# Patient Record
Sex: FEMALE | Race: BLACK OR AFRICAN AMERICAN | Employment: FULL TIME | URBAN - METROPOLITAN AREA
[De-identification: names, ages, dates, MRNs, and addresses within clinical notes are randomized per-mention and may not be internally consistent; named-entity substitution may affect disease eponyms.]

---

## 2017-10-28 ENCOUNTER — APPOINTMENT (OUTPATIENT)
Dept: ULTRASOUND IMAGING | Age: 22
End: 2017-10-28
Attending: EMERGENCY MEDICINE
Payer: COMMERCIAL

## 2017-10-28 ENCOUNTER — HOSPITAL ENCOUNTER (EMERGENCY)
Age: 22
Discharge: HOME | End: 2017-10-28
Attending: EMERGENCY MEDICINE | Admitting: EMERGENCY MEDICINE
Payer: COMMERCIAL

## 2017-10-28 VITALS
TEMPERATURE: 98.6 F | OXYGEN SATURATION: 100 % | SYSTOLIC BLOOD PRESSURE: 115 MMHG | RESPIRATION RATE: 18 BRPM | DIASTOLIC BLOOD PRESSURE: 72 MMHG | WEIGHT: 142 LBS | BODY MASS INDEX: 26.13 KG/M2 | HEIGHT: 62 IN | HEART RATE: 76 BPM

## 2017-10-28 DIAGNOSIS — K80.50 BILIARY COLIC: Primary | ICD-10-CM

## 2017-10-28 DIAGNOSIS — K80.20 GALLSTONES: ICD-10-CM

## 2017-10-28 LAB
ALBUMIN SERPL-MCNC: 3.7 G/DL (ref 3.5–5)
ALBUMIN/GLOB SERPL: 0.9 {RATIO} (ref 1.1–2.2)
ALP SERPL-CCNC: 34 U/L (ref 45–117)
ALT SERPL-CCNC: 29 U/L (ref 12–78)
ANION GAP SERPL CALC-SCNC: 7 MMOL/L (ref 5–15)
APPEARANCE UR: CLEAR
AST SERPL-CCNC: 50 U/L (ref 15–37)
BASOPHILS # BLD: 0.1 K/UL (ref 0–0.1)
BASOPHILS NFR BLD: 1 % (ref 0–1)
BILIRUB SERPL-MCNC: 0.5 MG/DL (ref 0.2–1)
BILIRUB UR QL: NEGATIVE
BUN SERPL-MCNC: 9 MG/DL (ref 6–20)
BUN/CREAT SERPL: 11 (ref 12–20)
CALCIUM SERPL-MCNC: 8.8 MG/DL (ref 8.5–10.1)
CHLORIDE SERPL-SCNC: 106 MMOL/L (ref 97–108)
CO2 SERPL-SCNC: 24 MMOL/L (ref 21–32)
COLOR UR: ABNORMAL
CREAT SERPL-MCNC: 0.82 MG/DL (ref 0.55–1.02)
EOSINOPHIL # BLD: 0.1 K/UL (ref 0–0.4)
EOSINOPHIL NFR BLD: 2 % (ref 0–7)
ERYTHROCYTE [DISTWIDTH] IN BLOOD BY AUTOMATED COUNT: 13.8 % (ref 11.5–14.5)
GLOBULIN SER CALC-MCNC: 4.1 G/DL (ref 2–4)
GLUCOSE SERPL-MCNC: 85 MG/DL (ref 65–100)
GLUCOSE UR STRIP.AUTO-MCNC: NEGATIVE MG/DL
HCT VFR BLD AUTO: 40.5 % (ref 35–47)
HGB BLD-MCNC: 13.8 G/DL (ref 11.5–16)
HGB UR QL STRIP: NEGATIVE
KETONES UR QL STRIP.AUTO: ABNORMAL MG/DL
LEUKOCYTE ESTERASE UR QL STRIP.AUTO: NEGATIVE
LIPASE SERPL-CCNC: 239 U/L (ref 73–393)
LYMPHOCYTES # BLD: 4.6 K/UL (ref 0.8–3.5)
LYMPHOCYTES NFR BLD: 59 % (ref 12–49)
MCH RBC QN AUTO: 29.2 PG (ref 26–34)
MCHC RBC AUTO-ENTMCNC: 34.1 G/DL (ref 30–36.5)
MCV RBC AUTO: 85.6 FL (ref 80–99)
MONOCYTES # BLD: 0.4 K/UL (ref 0–1)
MONOCYTES NFR BLD: 5 % (ref 5–13)
NEUTS SEG # BLD: 2.6 K/UL (ref 1.8–8)
NEUTS SEG NFR BLD: 33 % (ref 32–75)
NITRITE UR QL STRIP.AUTO: NEGATIVE
PH UR STRIP: 6 [PH] (ref 5–8)
PLATELET # BLD AUTO: 339 K/UL (ref 150–400)
POTASSIUM SERPL-SCNC: 5 MMOL/L (ref 3.5–5.1)
PROT SERPL-MCNC: 7.8 G/DL (ref 6.4–8.2)
PROT UR STRIP-MCNC: NEGATIVE MG/DL
RBC # BLD AUTO: 4.73 M/UL (ref 3.8–5.2)
SODIUM SERPL-SCNC: 137 MMOL/L (ref 136–145)
SP GR UR REFRACTOMETRY: >1.03 (ref 1–1.03)
UROBILINOGEN UR QL STRIP.AUTO: 1 EU/DL (ref 0.2–1)
WBC # BLD AUTO: 7.8 K/UL (ref 3.6–11)

## 2017-10-28 PROCEDURE — 81003 URINALYSIS AUTO W/O SCOPE: CPT | Performed by: EMERGENCY MEDICINE

## 2017-10-28 PROCEDURE — 99284 EMERGENCY DEPT VISIT MOD MDM: CPT

## 2017-10-28 PROCEDURE — 76705 ECHO EXAM OF ABDOMEN: CPT

## 2017-10-28 PROCEDURE — 36415 COLL VENOUS BLD VENIPUNCTURE: CPT | Performed by: EMERGENCY MEDICINE

## 2017-10-28 PROCEDURE — 83690 ASSAY OF LIPASE: CPT | Performed by: EMERGENCY MEDICINE

## 2017-10-28 PROCEDURE — 80053 COMPREHEN METABOLIC PANEL: CPT | Performed by: EMERGENCY MEDICINE

## 2017-10-28 PROCEDURE — 85025 COMPLETE CBC W/AUTO DIFF WBC: CPT | Performed by: EMERGENCY MEDICINE

## 2017-10-28 RX ORDER — OXYCODONE AND ACETAMINOPHEN 5; 325 MG/1; MG/1
1 TABLET ORAL
Qty: 10 TAB | Refills: 0 | Status: ON HOLD | OUTPATIENT
Start: 2017-10-28 | End: 2017-11-13

## 2017-10-28 NOTE — ED PROVIDER NOTES
HPI Comments: 25 y.o. female with no significant past medical history who presents from home with chief complaint of abd pain. Per pt, she has been experiencing intermittent RUQ pain for the past month. She notes that the pain has occurred predominantly during the morning, however recently her pains have appeared more frequently. Per pt, her RUQ pain lasts for, \"a couple hours\" during onset and is associated with nausea. Currently, the pt notes that her pain has decreased some compared to this morning. She rates her current pain 4/10. The pt notes that she thought her pains were related to food, however her pain has occurred during times she did not eat for several hours. Per pt, her grandmother is the only known relative who has received a cholecystectomy in the past. The pt denies fever, chills, V/D, CP, SOB, dizziness, frequency, dysuria and other urinary symptoms. There are no other acute medical concerns at this time. Social hx: Non-smoker, No ETOH use   PCP: Melanie Carver MD    Note written by Karen Reyes, as dictated by Gabrielle Zurita MD 8:25 AM           The history is provided by the patient. No  was used. No past medical history on file. No past surgical history on file. No family history on file. Social History     Social History    Marital status: SINGLE     Spouse name: N/A    Number of children: N/A    Years of education: N/A     Occupational History    Not on file. Social History Main Topics    Smoking status: Never Smoker    Smokeless tobacco: Never Used    Alcohol use No    Drug use: No    Sexual activity: Yes     Partners: Male     Birth control/ protection: Condom, Injection     Other Topics Concern    Not on file     Social History Narrative    No narrative on file         ALLERGIES: Review of patient's allergies indicates no known allergies. Review of Systems   Constitutional: Negative.   Negative for appetite change, chills and fever. HENT: Negative. Negative for rhinorrhea, sore throat and trouble swallowing. Eyes: Negative. Negative for photophobia. Respiratory: Negative. Negative for cough and shortness of breath. Cardiovascular: Negative. Negative for chest pain and palpitations. Gastrointestinal: Positive for abdominal pain (intermittent over the past month to her RUQ and predominant during the mornings. Pain lasts for a couple hours per each episode ) and nausea ( associated with abd pain ). Negative for vomiting. Endocrine: Negative. Genitourinary: Negative. Negative for dysuria, frequency and hematuria. Musculoskeletal: Negative. Negative for arthralgias. Skin: Negative. Allergic/Immunologic: Negative. Neurological: Negative. Negative for dizziness, syncope and weakness. Hematological: Negative. Psychiatric/Behavioral: Negative. Negative for behavioral problems. The patient is not nervous/anxious. All other systems reviewed and are negative. There were no vitals filed for this visit. Physical Exam   Constitutional: She appears well-developed and well-nourished. HENT:   Head: Normocephalic and atraumatic. Mouth/Throat: Oropharynx is clear and moist.   Eyes: EOM are normal. Pupils are equal, round, and reactive to light. Neck: Normal range of motion. Neck supple. Cardiovascular: Normal rate, regular rhythm, normal heart sounds and intact distal pulses. Exam reveals no gallop and no friction rub. No murmur heard. Pulmonary/Chest: Effort normal. No respiratory distress. She has no wheezes. She has no rales. Abdominal: Soft. There is tenderness (RUQ). There is no rebound. Musculoskeletal: Normal range of motion. She exhibits no tenderness. Neurological: She is alert. No cranial nerve deficit. Motor; symmetric   Skin: No erythema. Psychiatric: She has a normal mood and affect. Her behavior is normal.   Nursing note and vitals reviewed.      Note written by Karen Devlin, as dictated by Caroline Vega MD 8:25 AM    [facility]  ED Course       Procedures

## 2017-10-28 NOTE — PROGRESS NOTES
Prior to Admission Medications   Prescriptions Last Dose Informant Patient Reported? Taking? NORETHINDRONE-E.ESTRADIOL-IRON (MINASTRIN 24 FE PO) 10/27/2017 at 1300  Yes Yes   Sig: Take 1 Tab by mouth daily. Facility-Administered Medications: None   Self: List updated per patient interview. Birth control pill added.

## 2017-10-28 NOTE — DISCHARGE INSTRUCTIONS
Learning About Gallstones  What are gallstones? Gallstones are stones that form in the gallbladder. The gallbladder is a small sac located just under the liver. It stores bile released by the liver. Bile helps you digest fats. Gallstones can be smaller than a grain of sand or as large as a golf ball. Gallstones form when cholesterol and other things found in bile make stones. They can also form if the gallbladder doesn't empty as it should. Gallstones can also form in the common bile duct or cystic duct. These tubes carry bile from the gallbladder and the liver to the small intestine. What happens when you have gallstones? Gallstones can cause many different problems, such as:  · A blockage in the common bile duct. · Inflammation or infection of the gallbladder (acute cholecystitis). This can happen when a gallstone blocks the cystic duct. · Inflammation or infection of the common bile duct (cholangitis). This can happen when gallstones get stuck in the common bile duct. In rare cases, this can damage the liver or spread infection. · Pancreatitis, an inflammation of the pancreas. What are the symptoms? · Most people who have gallstones don't have symptoms. · Symptoms can include:  ¨ Mild pain in the pit of your stomach or in the upper right part of your belly. It may spread to your right upper back or shoulder blade area. ¨ Severe pain that may come and go or be steady. It may get worse when you eat. ¨ Fever and chills, if a gallstone is blocking a bile duct and causing an infection. ¨ Yellowing of your skin and the whites of your eyes. How can you prevent gallstones? There is no sure way to prevent gallstones. But you can reduce your risk of forming gallstones that can cause symptoms. · Stay at a healthy weight. If you need to lose weight, do so slowly and sensibly. · Eat regular, balanced meals. · Be active, and exercise regularly. How are gallstones treated?   · If you don't have symptoms, you probably don't need treatment. · For mild symptoms, your doctor may have you take pain medicine and wait to see if the pain goes away. · For severe pain or infection, or if you have more than one gallstone attack, your doctor may suggest surgery to have your gallbladder removed. The body works fine without a gallbladder. Follow-up care is a key part of your treatment and safety. Be sure to make and go to all appointments, and call your doctor if you are having problems. It's also a good idea to know your test results and keep a list of the medicines you take. Where can you learn more? Go to http://arlene-eddie.info/. Enter  in the search box to learn more about \"Learning About Gallstones. \"  Current as of: May 12, 2017  Content Version: 11.4  © 4888-5044 Healthwise, [employer]. Care instructions adapted under license by [employer] (which disclaims liability or warranty for this information). If you have questions about a medical condition or this instruction, always ask your healthcare professional. [address] any warranty or liability for your use of this information.

## 2017-11-01 ENCOUNTER — OFFICE VISIT (OUTPATIENT)
Dept: SURGERY | Age: 22
End: 2017-11-01

## 2017-11-01 VITALS
WEIGHT: 139 LBS | BODY MASS INDEX: 25.58 KG/M2 | HEIGHT: 62 IN | RESPIRATION RATE: 18 BRPM | DIASTOLIC BLOOD PRESSURE: 62 MMHG | TEMPERATURE: 98.9 F | SYSTOLIC BLOOD PRESSURE: 118 MMHG

## 2017-11-01 DIAGNOSIS — K80.20 SYMPTOMATIC CHOLELITHIASIS: Primary | ICD-10-CM

## 2017-11-01 NOTE — PATIENT INSTRUCTIONS
Cholecystectomy: Before Your Surgery  What is cholecystectomy? Cholecystectomy (od-iwx-wqw-MALLORY-tuh-julian) is a type of surgery. It removes a diseased gallbladder. This surgery is usually done as a laparoscopic surgery. The doctor puts a lighted tube and other surgical tools through small cuts (incisions) in your belly. The tube is called a scope. It lets your doctor see your organs so he or she can do the surgery. The incisions leave scars that fade with time. Most people go home the same day. You probably will feel better each day. Most people have only a small amount of pain after 1 week. If you have a desk job, you can probably go back to work in 1 to 2 weeks. If you lift heavy objects or have a very active job, it may take up to 4 weeks. In some cases, open surgery is the best choice. Your doctor may choose open surgery in advance. Or he or she may choose it in the middle of laparoscopic surgery. In open surgery, the doctor makes a larger incision in your upper belly. If you have open surgery, you will probably stay in the hospital for 2 to 4 days. And it may take 4 to 6 weeks to get back to your normal routine. Follow-up care is a key part of your treatment and safety. Be sure to make and go to all appointments, and call your doctor if you are having problems. It's also a good idea to know your test results and keep a list of the medicines you take. What happens before surgery? ?Surgery can be stressful. This information will help you understand what you can expect. And it will help you safely prepare for surgery. ? Preparing for surgery  ? · Understand exactly what surgery is planned, along with the risks, benefits, and other options. · Tell your doctors ALL the medicines, vitamins, supplements, and herbal remedies you take. Some of these can increase the risk of bleeding or interact with anesthesia. ?  · If you take blood thinners, such as warfarin (Coumadin), clopidogrel (Plavix), or aspirin, be sure to talk to your doctor. He or she will tell you if you should stop taking these medicines before your surgery. Make sure that you understand exactly what your doctor wants you to do.   ? · Your doctor will tell you which medicines to take or stop before your surgery. You may need to stop taking certain medicines a week or more before surgery. So talk to your doctor as soon as you can.   ? · If you have an advance directive, let your doctor know. It may include a living will and a durable power of  for health care. Bring a copy to the hospital. If you don't have one, you may want to prepare one. It lets your doctor and loved ones know your health care wishes. Doctors advise that everyone prepare these papers before any type of surgery or procedure. ? · You may need to empty your colon with an enema or laxative. Your doctor will tell you how to do this. What happens on the day of surgery? · Follow the instructions exactly about when to stop eating and drinking. If you don't, your surgery may be canceled. If your doctor told you to take your medicines on the day of surgery, take them with only a sip of water. ? · Take a bath or shower before you come in for your surgery. Do not apply lotions, perfumes, deodorants, or nail polish. ? · Do not shave the surgical site yourself. ? · Take off all jewelry and piercings. And take out contact lenses, if you wear them. ? At the hospital or surgery center   · Bring a picture ID. ? · The area for surgery is often marked to make sure there are no errors. ? · You will be kept comfortable and safe by your anesthesia provider. You will be asleep during the surgery. ? · The surgery usually takes 1 to 2 hours. Going home   · Be sure you have someone to drive you home. Anesthesia and pain medicine make it unsafe for you to drive. ? · You will be given more specific instructions about recovering from your surgery.  They will cover things like diet, wound care, follow-up care, driving, and getting back to your normal routine. When should you call your doctor? · You have questions or concerns. ? · You don't understand how to prepare for your surgery. ? · You become ill before the surgery (such as fever, flu, or a cold). ? · You need to reschedule or have changed your mind about having the surgery. Where can you learn more? Go to http://arlene-eddie.info/. Enter A883 in the search box to learn more about \"Cholecystectomy: Before Your Surgery. \"  Current as of: May 12, 2017  Content Version: 11.4  © 8070-1249 Healthwise, [employer]. Care instructions adapted under license by [employer] (which disclaims liability or warranty for this information). If you have questions about a medical condition or this instruction, always ask your healthcare professional. [address] any warranty or liability for your use of this information.

## 2017-11-01 NOTE — LETTER
NOTIFICATION OF RETURN TO WORK / SCHOOL 
 
11/30/2017 1:53 PM 
 
Ms. Clair Bennett [address] [place] [address] [place] To Whom It May Concern: 
 
Clair Bennett was under the care of [address] [place] [address] from 11/13/17 to 11/30/17. She will be able to return to work on 11/30/17 with no restrictions. If there are questions or concerns please have the patient contact our office. Sincerely, Ulysses Iqbal MD

## 2017-11-01 NOTE — PROGRESS NOTES
[facility] General Surgery History and Physical    History of Present Illness:      Neptali Fallon is a 25 y.o. female who has been having epigastric and RUQ abdominal pain. The pain started a few months ago but has become more painful recently. The pain is a 8 fo 10 when she has it. The pain seems to occur randomly and not always associated with food. She has some nausea with the pain but no vomiting. She was recently seen in the ED for this pain. The pain will last a few hours and then go away. PMH - none    PSH - none    Current Outpatient Prescriptions:     NORETHINDRONE-E.ESTRADIOL-IRON (MINASTRIN 24 FE PO), Take 1 Tab by mouth daily. , Disp: , Rfl:     oxyCODONE-acetaminophen (PERCOCET) 5-325 mg per tablet, Take 1 Tab by mouth every four (4) hours as needed for Pain. Max Daily Amount: 6 Tabs., Disp: 10 Tab, Rfl: 0    No Known Allergies    Social History     Social History    Marital status: SINGLE     Spouse name: N/A    Number of children: N/A    Years of education: N/A     Occupational History    Not on file.      Social History Main Topics    Smoking status: Never Smoker    Smokeless tobacco: Never Used    Alcohol use No    Drug use: No    Sexual activity: Yes     Partners: Male     Birth control/ protection: Condom, Injection     Other Topics Concern    Not on file     Social History Narrative       Family History   Problem Relation Age of Onset   [facility] Cancer Father     Hypertension Father     Cancer Maternal Grandmother     Hypertension Maternal Grandmother        ROS   Constitutional: negative  Ears, Nose, Mouth, Throat, and Face: negative  Respiratory: negative  Cardiovascular: negative  Gastrointestinal: positive for nausea and abdominal pain  Genitourinary:negative  Integument/Breast: negative  Hematologic/Lymphatic: negative  Behavioral/Psychiatric: negative  Allergic/Immunologic: negative      Physical Exam:     Visit Vitals    /62    Temp 98.9 °F (37.2 °C)    Resp 18    Ht 5' 2\" (1.575 m)    Wt 139 lb (63 kg)    BMI 25.42 kg/m2       General - alert and oriented, no apparent distress  HEENT - no jaundice, no hearing imparement  Pulm - CTAB, no C/W/R  CV - RRR, no M/R/G  Abd - soft, ND, BS present, NTTP, no hernia  Ext - pulses intact in UE and LE bilaterally, no edema  Skin - supple, no rashes  Psychiatric - normal affect, good mood    Labs  Lab Results   Component Value Date/Time    Sodium 137 10/28/2017 08:20 AM    Potassium 5.0 10/28/2017 08:20 AM    Chloride 106 10/28/2017 08:20 AM    CO2 24 10/28/2017 08:20 AM    Anion gap 7 10/28/2017 08:20 AM    Glucose 85 10/28/2017 08:20 AM    BUN 9 10/28/2017 08:20 AM    Creatinine 0.82 10/28/2017 08:20 AM    BUN/Creatinine ratio 11 10/28/2017 08:20 AM    GFR est AA >60 10/28/2017 08:20 AM    GFR est non-AA >60 10/28/2017 08:20 AM    Calcium 8.8 10/28/2017 08:20 AM    Bilirubin, total 0.5 10/28/2017 08:20 AM    AST (SGOT) 50 10/28/2017 08:20 AM    Alk. phosphatase 34 10/28/2017 08:20 AM    Protein, total 7.8 10/28/2017 08:20 AM    Albumin 3.7 10/28/2017 08:20 AM    Globulin 4.1 10/28/2017 08:20 AM    A-G Ratio 0.9 10/28/2017 08:20 AM    ALT (SGPT) 29 10/28/2017 08:20 AM     Lab Results   Component Value Date/Time    WBC 7.8 10/28/2017 08:20 AM    Hemoglobin (POC) 14.2 08/01/2012 09:38 AM    HGB 13.8 10/28/2017 08:20 AM    HCT 40.5 10/28/2017 08:20 AM    PLATELET 029 09/22/2847 08:20 AM    MCV 85.6 10/28/2017 08:20 AM         Imaging  RUQ US - The liver is  normal. The common bile duct is normal measuring 2.5 mm in diameter. The  gallbladder contains gallstones. The sonographic Peter Shell sign is absent. There is  no evidence of cholecystitis. The right kidney measures 9.6 cm in length. There  is no hydronephrosis or visible ascites. I have reviewed and agree with all of the pertinent images    Assessment:     Rocco Lopez is a 25 y.o. female with symptomatic cholelithiasis    Recommendations:     1.    She does have pain cause by gallstones but no infection of the GB. She will need laparoscopic cholecystectomy in the OR. I have discussed the above procedure with the patient in detail. We reviewed the benefits and possible complications of the surgery which include bleeding, infection, damage to adjacent organs, venous thromboembolism, need for repeat surgery, death and other unforseen complications. The patient agreed to proceed with the surgery. Reba Okeefe MD    Ms. Leyda [place] has a reminder for a \"due or due soon\" health maintenance. I have asked that she contact her primary care provider for follow-up on this health maintenance.

## 2017-11-01 NOTE — LETTER
11/1/2017 12:10 PM 
 
Ms. Okey Ponds [place] [facility] [address] Dear Ms. Cynthia Romero, 
 
Surgery is scheduled as follows: 
 
Surgery date: 11/13/2017      Location: [place] [address] 
 
Arrival time: 05:30 AM    Start time: 07:30 AM 
 
DO NOT EAT OR DRINK ANYTHING PAST MIDNIGHT THE NIGHT BEFORE SURGERY 1st Post Operative appointment:  
 
Monday, November 27, 2017 09:00 AM with [address] Office Phone: [phone] For questions regarding this information please contact Vira Torres at [phone]. Sincerely, 
 
 
Vira Torres Surgical Scheduler

## 2017-11-01 NOTE — LETTER
11/1/2017 3:59 PM 
 
Patient:  Kolby Horner YOB: 1995 Date of Visit: 11/1/2017 Dear Jaren Ribeiro 
[address] [address] [place] Facsimile: [phone] 
 : Thank you for referring Ms. Kolby Horner to me for evaluation/treatment. Below are the relevant portions of my assessment and plan of care. If you have questions, please do not hesitate to call me. I look forward to following Ms. Lisette Hernández along with you. Sincerely, Magy Sanchez MD

## 2017-11-01 NOTE — PROGRESS NOTES
1. Have you been to the ER, urgent care clinic since your last visit? Hospitalized since your last visit?  10/28/17 [facility] ED abd pain    2. Have you seen or consulted any other health care providers outside of the [address] [place] [address] since your last visit? Include any pap smears or colon screening.    no

## 2017-11-09 ENCOUNTER — ANESTHESIA EVENT (OUTPATIENT)
Dept: SURGERY | Age: 22
End: 2017-11-09
Payer: COMMERCIAL

## 2017-11-13 ENCOUNTER — HOSPITAL ENCOUNTER (OUTPATIENT)
Age: 22
Setting detail: OUTPATIENT SURGERY
Discharge: HOME | End: 2017-11-13
Attending: SURGERY | Admitting: SURGERY
Payer: COMMERCIAL

## 2017-11-13 ENCOUNTER — ANESTHESIA (OUTPATIENT)
Dept: SURGERY | Age: 22
End: 2017-11-13
Payer: COMMERCIAL

## 2017-11-13 VITALS
BODY MASS INDEX: 25.76 KG/M2 | HEART RATE: 99 BPM | SYSTOLIC BLOOD PRESSURE: 113 MMHG | OXYGEN SATURATION: 100 % | HEIGHT: 62 IN | WEIGHT: 140 LBS | TEMPERATURE: 97.6 F | RESPIRATION RATE: 15 BRPM | DIASTOLIC BLOOD PRESSURE: 61 MMHG

## 2017-11-13 LAB — HCG UR QL: NEGATIVE

## 2017-11-13 PROCEDURE — 74011000250 HC RX REV CODE- 250

## 2017-11-13 PROCEDURE — 77030011640 HC PAD GRND REM COVD -A: Performed by: SURGERY

## 2017-11-13 PROCEDURE — 77030002895 HC DEV VASC CLOSR COVD -B: Performed by: SURGERY

## 2017-11-13 PROCEDURE — 77030031139 HC SUT VCRL2 J&J -A: Performed by: SURGERY

## 2017-11-13 PROCEDURE — 77030010507 HC ADH SKN DERMBND J&J -B: Performed by: SURGERY

## 2017-11-13 PROCEDURE — 77030035051: Performed by: SURGERY

## 2017-11-13 PROCEDURE — 77030026438 HC STYL ET INTUB CARD -A: Performed by: ANESTHESIOLOGY

## 2017-11-13 PROCEDURE — 77030002933 HC SUT MCRYL J&J -A: Performed by: SURGERY

## 2017-11-13 PROCEDURE — 77030037032 HC INSRT SCIS CLICKLLINE DISP STOR -B: Performed by: SURGERY

## 2017-11-13 PROCEDURE — 76060000033 HC ANESTHESIA 1 TO 1.5 HR: Performed by: SURGERY

## 2017-11-13 PROCEDURE — 74011250636 HC RX REV CODE- 250/636

## 2017-11-13 PROCEDURE — 77030035045 HC TRCR ENDOSC VRSPRT BLDLSS COVD -B: Performed by: SURGERY

## 2017-11-13 PROCEDURE — 77030020053 HC ELECTRD LAPSCP COVD -B: Performed by: SURGERY

## 2017-11-13 PROCEDURE — 88304 TISSUE EXAM BY PATHOLOGIST: CPT | Performed by: SURGERY

## 2017-11-13 PROCEDURE — 76010000149 HC OR TIME 1 TO 1.5 HR: Performed by: SURGERY

## 2017-11-13 PROCEDURE — 74011250636 HC RX REV CODE- 250/636: Performed by: SURGERY

## 2017-11-13 PROCEDURE — 77030008684 HC TU ET CUF COVD -B: Performed by: ANESTHESIOLOGY

## 2017-11-13 PROCEDURE — 74011250636 HC RX REV CODE- 250/636: Performed by: ANESTHESIOLOGY

## 2017-11-13 PROCEDURE — 76210000006 HC OR PH I REC 0.5 TO 1 HR: Performed by: SURGERY

## 2017-11-13 PROCEDURE — 77030032490 HC SLV COMPR SCD KNE COVD -B: Performed by: SURGERY

## 2017-11-13 PROCEDURE — 77030020747 HC TU INSUF ENDOSC TELE -A: Performed by: SURGERY

## 2017-11-13 PROCEDURE — 77030035048 HC TRCR ENDOSC OPTCL COVD -B: Performed by: SURGERY

## 2017-11-13 PROCEDURE — 77030020782 HC GWN BAIR PAWS FLX 3M -B

## 2017-11-13 PROCEDURE — 76210000020 HC REC RM PH II FIRST 0.5 HR: Performed by: SURGERY

## 2017-11-13 PROCEDURE — 77030009852 HC PCH RTVR ENDOSC COVD -B: Performed by: SURGERY

## 2017-11-13 PROCEDURE — 77030012029 HC APPL CLP LIG COVD -C: Performed by: SURGERY

## 2017-11-13 PROCEDURE — 81025 URINE PREGNANCY TEST: CPT

## 2017-11-13 PROCEDURE — 74011000250 HC RX REV CODE- 250: Performed by: SURGERY

## 2017-11-13 RX ORDER — OXYCODONE AND ACETAMINOPHEN 5; 325 MG/1; MG/1
1 TABLET ORAL AS NEEDED
Status: DISCONTINUED | OUTPATIENT
Start: 2017-11-13 | End: 2017-11-13 | Stop reason: HOSPADM

## 2017-11-13 RX ORDER — LIDOCAINE HYDROCHLORIDE 20 MG/ML
INJECTION, SOLUTION EPIDURAL; INFILTRATION; INTRACAUDAL; PERINEURAL AS NEEDED
Status: DISCONTINUED | OUTPATIENT
Start: 2017-11-13 | End: 2017-11-13 | Stop reason: HOSPADM

## 2017-11-13 RX ORDER — DIPHENHYDRAMINE HYDROCHLORIDE 50 MG/ML
12.5 INJECTION, SOLUTION INTRAMUSCULAR; INTRAVENOUS AS NEEDED
Status: DISCONTINUED | OUTPATIENT
Start: 2017-11-13 | End: 2017-11-13 | Stop reason: HOSPADM

## 2017-11-13 RX ORDER — BUPIVACAINE HYDROCHLORIDE AND EPINEPHRINE 5; 5 MG/ML; UG/ML
30 INJECTION, SOLUTION EPIDURAL; INTRACAUDAL; PERINEURAL ONCE
Status: COMPLETED | OUTPATIENT
Start: 2017-11-14 | End: 2017-11-13

## 2017-11-13 RX ORDER — DEXAMETHASONE SODIUM PHOSPHATE 4 MG/ML
INJECTION, SOLUTION INTRA-ARTICULAR; INTRALESIONAL; INTRAMUSCULAR; INTRAVENOUS; SOFT TISSUE AS NEEDED
Status: DISCONTINUED | OUTPATIENT
Start: 2017-11-13 | End: 2017-11-13 | Stop reason: HOSPADM

## 2017-11-13 RX ORDER — SODIUM CHLORIDE 0.9 % (FLUSH) 0.9 %
5-10 SYRINGE (ML) INJECTION AS NEEDED
Status: DISCONTINUED | OUTPATIENT
Start: 2017-11-13 | End: 2017-11-13 | Stop reason: HOSPADM

## 2017-11-13 RX ORDER — ROCURONIUM BROMIDE 10 MG/ML
INJECTION, SOLUTION INTRAVENOUS AS NEEDED
Status: DISCONTINUED | OUTPATIENT
Start: 2017-11-13 | End: 2017-11-13 | Stop reason: HOSPADM

## 2017-11-13 RX ORDER — FENTANYL CITRATE 50 UG/ML
INJECTION, SOLUTION INTRAMUSCULAR; INTRAVENOUS AS NEEDED
Status: DISCONTINUED | OUTPATIENT
Start: 2017-11-13 | End: 2017-11-13 | Stop reason: HOSPADM

## 2017-11-13 RX ORDER — MIDAZOLAM HYDROCHLORIDE 1 MG/ML
1 INJECTION, SOLUTION INTRAMUSCULAR; INTRAVENOUS AS NEEDED
Status: DISCONTINUED | OUTPATIENT
Start: 2017-11-13 | End: 2017-11-13 | Stop reason: HOSPADM

## 2017-11-13 RX ORDER — ONDANSETRON 2 MG/ML
INJECTION INTRAMUSCULAR; INTRAVENOUS AS NEEDED
Status: DISCONTINUED | OUTPATIENT
Start: 2017-11-13 | End: 2017-11-13 | Stop reason: HOSPADM

## 2017-11-13 RX ORDER — LIDOCAINE HYDROCHLORIDE 10 MG/ML
0.1 INJECTION, SOLUTION EPIDURAL; INFILTRATION; INTRACAUDAL; PERINEURAL AS NEEDED
Status: DISCONTINUED | OUTPATIENT
Start: 2017-11-13 | End: 2017-11-13 | Stop reason: HOSPADM

## 2017-11-13 RX ORDER — HYDROMORPHONE HYDROCHLORIDE 1 MG/ML
INJECTION, SOLUTION INTRAMUSCULAR; INTRAVENOUS; SUBCUTANEOUS AS NEEDED
Status: DISCONTINUED | OUTPATIENT
Start: 2017-11-13 | End: 2017-11-13 | Stop reason: HOSPADM

## 2017-11-13 RX ORDER — MIDAZOLAM HYDROCHLORIDE 1 MG/ML
0.5 INJECTION, SOLUTION INTRAMUSCULAR; INTRAVENOUS
Status: DISCONTINUED | OUTPATIENT
Start: 2017-11-13 | End: 2017-11-13 | Stop reason: HOSPADM

## 2017-11-13 RX ORDER — OXYCODONE AND ACETAMINOPHEN 5; 325 MG/1; MG/1
1-2 TABLET ORAL
Qty: 40 TAB | Refills: 0 | Status: SHIPPED | OUTPATIENT
Start: 2017-11-13 | End: 2018-06-17

## 2017-11-13 RX ORDER — SODIUM CHLORIDE 0.9 % (FLUSH) 0.9 %
5-10 SYRINGE (ML) INJECTION EVERY 8 HOURS
Status: DISCONTINUED | OUTPATIENT
Start: 2017-11-13 | End: 2017-11-13 | Stop reason: HOSPADM

## 2017-11-13 RX ORDER — GLYCOPYRROLATE 0.2 MG/ML
INJECTION INTRAMUSCULAR; INTRAVENOUS AS NEEDED
Status: DISCONTINUED | OUTPATIENT
Start: 2017-11-13 | End: 2017-11-13 | Stop reason: HOSPADM

## 2017-11-13 RX ORDER — SODIUM CHLORIDE, SODIUM LACTATE, POTASSIUM CHLORIDE, CALCIUM CHLORIDE 600; 310; 30; 20 MG/100ML; MG/100ML; MG/100ML; MG/100ML
INJECTION, SOLUTION INTRAVENOUS
Status: DISCONTINUED | OUTPATIENT
Start: 2017-11-13 | End: 2017-11-13 | Stop reason: HOSPADM

## 2017-11-13 RX ORDER — FENTANYL CITRATE 50 UG/ML
25 INJECTION, SOLUTION INTRAMUSCULAR; INTRAVENOUS
Status: DISCONTINUED | OUTPATIENT
Start: 2017-11-13 | End: 2017-11-13 | Stop reason: HOSPADM

## 2017-11-13 RX ORDER — PROPOFOL 10 MG/ML
INJECTION, EMULSION INTRAVENOUS AS NEEDED
Status: DISCONTINUED | OUTPATIENT
Start: 2017-11-13 | End: 2017-11-13 | Stop reason: HOSPADM

## 2017-11-13 RX ORDER — NEOSTIGMINE METHYLSULFATE 1 MG/ML
INJECTION INTRAVENOUS AS NEEDED
Status: DISCONTINUED | OUTPATIENT
Start: 2017-11-13 | End: 2017-11-13 | Stop reason: HOSPADM

## 2017-11-13 RX ORDER — SODIUM CHLORIDE, SODIUM LACTATE, POTASSIUM CHLORIDE, CALCIUM CHLORIDE 600; 310; 30; 20 MG/100ML; MG/100ML; MG/100ML; MG/100ML
125 INJECTION, SOLUTION INTRAVENOUS CONTINUOUS
Status: DISCONTINUED | OUTPATIENT
Start: 2017-11-13 | End: 2017-11-13 | Stop reason: HOSPADM

## 2017-11-13 RX ORDER — MORPHINE SULFATE 10 MG/ML
2 INJECTION, SOLUTION INTRAMUSCULAR; INTRAVENOUS
Status: DISCONTINUED | OUTPATIENT
Start: 2017-11-13 | End: 2017-11-13 | Stop reason: HOSPADM

## 2017-11-13 RX ORDER — ONDANSETRON 2 MG/ML
4 INJECTION INTRAMUSCULAR; INTRAVENOUS AS NEEDED
Status: DISCONTINUED | OUTPATIENT
Start: 2017-11-13 | End: 2017-11-13 | Stop reason: HOSPADM

## 2017-11-13 RX ORDER — MIDAZOLAM HYDROCHLORIDE 1 MG/ML
INJECTION, SOLUTION INTRAMUSCULAR; INTRAVENOUS AS NEEDED
Status: DISCONTINUED | OUTPATIENT
Start: 2017-11-13 | End: 2017-11-13 | Stop reason: HOSPADM

## 2017-11-13 RX ORDER — SODIUM CHLORIDE 9 MG/ML
1000 INJECTION, SOLUTION INTRAVENOUS CONTINUOUS
Status: DISCONTINUED | OUTPATIENT
Start: 2017-11-13 | End: 2017-11-13 | Stop reason: HOSPADM

## 2017-11-13 RX ORDER — FENTANYL CITRATE 50 UG/ML
50 INJECTION, SOLUTION INTRAMUSCULAR; INTRAVENOUS AS NEEDED
Status: DISCONTINUED | OUTPATIENT
Start: 2017-11-13 | End: 2017-11-13 | Stop reason: HOSPADM

## 2017-11-13 RX ORDER — SODIUM CHLORIDE 9 MG/ML
50 INJECTION, SOLUTION INTRAVENOUS CONTINUOUS
Status: DISCONTINUED | OUTPATIENT
Start: 2017-11-13 | End: 2017-11-13 | Stop reason: HOSPADM

## 2017-11-13 RX ORDER — HYDROMORPHONE HYDROCHLORIDE 1 MG/ML
0.2 INJECTION, SOLUTION INTRAMUSCULAR; INTRAVENOUS; SUBCUTANEOUS
Status: DISCONTINUED | OUTPATIENT
Start: 2017-11-13 | End: 2017-11-13 | Stop reason: HOSPADM

## 2017-11-13 RX ORDER — CEFAZOLIN SODIUM/WATER 2 G/20 ML
2 SYRINGE (ML) INTRAVENOUS ONCE
Status: COMPLETED | OUTPATIENT
Start: 2017-11-14 | End: 2017-11-13

## 2017-11-13 RX ORDER — SUCCINYLCHOLINE CHLORIDE 20 MG/ML
INJECTION INTRAMUSCULAR; INTRAVENOUS AS NEEDED
Status: DISCONTINUED | OUTPATIENT
Start: 2017-11-13 | End: 2017-11-13 | Stop reason: HOSPADM

## 2017-11-13 RX ORDER — PHENYLEPHRINE HCL IN 0.9% NACL 0.4MG/10ML
SYRINGE (ML) INTRAVENOUS AS NEEDED
Status: DISCONTINUED | OUTPATIENT
Start: 2017-11-13 | End: 2017-11-13 | Stop reason: HOSPADM

## 2017-11-13 RX ORDER — ONDANSETRON 4 MG/1
4 TABLET, ORALLY DISINTEGRATING ORAL
Qty: 30 TAB | Refills: 0 | Status: SHIPPED | OUTPATIENT
Start: 2017-11-13 | End: 2018-06-17

## 2017-11-13 RX ADMIN — ROCURONIUM BROMIDE 25 MG: 10 INJECTION, SOLUTION INTRAVENOUS at 07:47

## 2017-11-13 RX ADMIN — ONDANSETRON 4 MG: 2 INJECTION INTRAMUSCULAR; INTRAVENOUS at 09:28

## 2017-11-13 RX ADMIN — FENTANYL CITRATE 25 MCG: 50 INJECTION, SOLUTION INTRAMUSCULAR; INTRAVENOUS at 09:06

## 2017-11-13 RX ADMIN — SODIUM CHLORIDE, SODIUM LACTATE, POTASSIUM CHLORIDE, AND CALCIUM CHLORIDE 125 ML/HR: 600; 310; 30; 20 INJECTION, SOLUTION INTRAVENOUS at 06:41

## 2017-11-13 RX ADMIN — ONDANSETRON 4 MG: 2 INJECTION INTRAMUSCULAR; INTRAVENOUS at 07:48

## 2017-11-13 RX ADMIN — NEOSTIGMINE METHYLSULFATE 4 MG: 1 INJECTION INTRAVENOUS at 08:34

## 2017-11-13 RX ADMIN — PROPOFOL 150 MG: 10 INJECTION, EMULSION INTRAVENOUS at 07:36

## 2017-11-13 RX ADMIN — SODIUM CHLORIDE, SODIUM LACTATE, POTASSIUM CHLORIDE, CALCIUM CHLORIDE: 600; 310; 30; 20 INJECTION, SOLUTION INTRAVENOUS at 07:26

## 2017-11-13 RX ADMIN — HYDROMORPHONE HYDROCHLORIDE 0.25 MG: 1 INJECTION, SOLUTION INTRAMUSCULAR; INTRAVENOUS; SUBCUTANEOUS at 08:36

## 2017-11-13 RX ADMIN — SUCCINYLCHOLINE CHLORIDE 100 MG: 20 INJECTION INTRAMUSCULAR; INTRAVENOUS at 07:37

## 2017-11-13 RX ADMIN — ROCURONIUM BROMIDE 5 MG: 10 INJECTION, SOLUTION INTRAVENOUS at 07:36

## 2017-11-13 RX ADMIN — FENTANYL CITRATE 75 MCG: 50 INJECTION, SOLUTION INTRAMUSCULAR; INTRAVENOUS at 07:36

## 2017-11-13 RX ADMIN — FENTANYL CITRATE 25 MCG: 50 INJECTION, SOLUTION INTRAMUSCULAR; INTRAVENOUS at 08:37

## 2017-11-13 RX ADMIN — DEXAMETHASONE SODIUM PHOSPHATE 6 MG: 4 INJECTION, SOLUTION INTRA-ARTICULAR; INTRALESIONAL; INTRAMUSCULAR; INTRAVENOUS; SOFT TISSUE at 07:48

## 2017-11-13 RX ADMIN — Medication 2 G: at 07:42

## 2017-11-13 RX ADMIN — GLYCOPYRROLATE 0.5 MG: 0.2 INJECTION INTRAMUSCULAR; INTRAVENOUS at 08:34

## 2017-11-13 RX ADMIN — FENTANYL CITRATE 50 MCG: 50 INJECTION, SOLUTION INTRAMUSCULAR; INTRAVENOUS at 08:03

## 2017-11-13 RX ADMIN — LIDOCAINE HYDROCHLORIDE 60 MG: 20 INJECTION, SOLUTION EPIDURAL; INFILTRATION; INTRACAUDAL; PERINEURAL at 07:36

## 2017-11-13 RX ADMIN — MIDAZOLAM HYDROCHLORIDE 2 MG: 1 INJECTION, SOLUTION INTRAMUSCULAR; INTRAVENOUS at 07:28

## 2017-11-13 RX ADMIN — Medication 80 MCG: at 08:09

## 2017-11-13 RX ADMIN — FENTANYL CITRATE 25 MCG: 50 INJECTION, SOLUTION INTRAMUSCULAR; INTRAVENOUS at 09:12

## 2017-11-13 RX ADMIN — FENTANYL CITRATE 50 MCG: 50 INJECTION, SOLUTION INTRAMUSCULAR; INTRAVENOUS at 07:39

## 2017-11-13 RX ADMIN — HYDROMORPHONE HYDROCHLORIDE 0.25 MG: 1 INJECTION, SOLUTION INTRAMUSCULAR; INTRAVENOUS; SUBCUTANEOUS at 08:12

## 2017-11-13 NOTE — BRIEF OP NOTE
BRIEF OPERATIVE NOTE    Date of Procedure: 11/13/2017   Preoperative Diagnosis: SYMPTOMATIC CHOLELITHIASIS  Postoperative Diagnosis: SYMPTOMATIC CHOLELITHIASIS     Procedure(s):  LAPAROSCOPIC CHOLECYSTECTOMY  Surgeon(s) and Role:     * Jeromy Canchola MD - Primary         Assistant Staff:  Physician Assistant: VERONICA العراقي    Surgical Staff:  Circ-1: Tray Ni RN  Physician Assistant: VERONICA العراقي  Scrub RN-1: Marii Garcia RN  Event Time In   Incision Start 0801   Incision Close      Anesthesia: General   Estimated Blood Loss: none  Specimens:   ID Type Source Tests Collected by Time Destination   1 : Gallbladder Fresh Tissue  Jeromy Canchola MD 11/13/2017 1559 Pathology      Findings: numerous small stones in the GB   Complications: none  Implants: * No implants in log *

## 2017-11-13 NOTE — ANESTHESIA PREPROCEDURE EVALUATION
Anesthetic History   No history of anesthetic complications            Review of Systems / Medical History  Patient summary reviewed, nursing notes reviewed and pertinent labs reviewed    Pulmonary  Within defined limits          Asthma        Neuro/Psych   Within defined limits           Cardiovascular  Within defined limits                     GI/Hepatic/Renal  Within defined limits              Endo/Other  Within defined limits           Other Findings              Physical Exam    Airway  Mallampati: II  TM Distance: 4 - 6 cm  Neck ROM: normal range of motion   Mouth opening: Normal     Cardiovascular  Regular rate and rhythm,  S1 and S2 normal,  no murmur, click, rub, or gallop             Dental  No notable dental hx       Pulmonary  Breath sounds clear to auscultation               Abdominal  GI exam deferred       Other Findings            Anesthetic Plan    ASA: 2  Anesthesia type: general          Induction: Intravenous  Anesthetic plan and risks discussed with: Patient

## 2017-11-13 NOTE — OP NOTES
[address] [place] [address], [address] [place] [address] Ave   OP NOTE       Name:  Loree Bernheim   MR#:  [id number]   :  1995   Account #:  [de-identified]    Surgery Date:  2017   Date of Adm:  2017       PREOPERATIVE DIAGNOSIS: Symptomatic cholelithiasis. POSTOPERATIVE DIAGNOSIS: Symptomatic cholelithiasis. PROCEDURES PERFORMED: Laparoscopic cholecystectomy. SURGEON: Mirian Hamlin MD    ASSISTANT: VERONICA Kc    INDICATION FOR OPERATION: The patient is a 60-year-old female   with right upper quadrant pain, with stones in the gallbladder, that is   needing laparoscopic cholecystectomy. My PA, Varghese Ayala, was   necessary for the operation due to the complex nature of the operation,   was required for operation of the camera and manipulation of the   gallbladder. DESCRIPTION OF PROCEDURE: The patient was met in the preop   holding area. The H and P was updated. Consent was signed. All risks   and benefits were explained to the patient prior to the start of the   operation. She was taken back to the operating room. She was lying in   a supine position. The abdomen was prepped and draped in standard   sterile fashion. Time-out was called. Antibiotics were given. SCDs were   on lower extremities. We started the operation by making a 5 mm   incision to the right upper quadrant, inserting a Visiport trocar into the   abdominal cavity, insufflating to 15 mmHg. We then placed a 12 mm   periumbilical trocar, a 5 mm subxiphoid trocar and a 5 mm right-sided   trocar. We then grasped the gallbladder, pushed it up and over the   liver. We dissected down to the infundibulum of the gallbladder,   dissecting free the cystic duct and cystic artery, identifying both   structures clearly going into the gallbladder with no intervening   structures in between. The critical view of safety was obtained.  We   then placed 2 clips on the cystic artery on the patient's side, 1 on the   distal side and cut in between. We then placed 3 clips on the cystic   duct on the patient's side, 1 on the distal side and cut in between. We   then removed the gallbladder from the gallbladder fossa with hook   cautery, cauterizing any bleeding from the liver bed along the way. There was little bit of bile spilled which was suctioned out at the end of   the case, and irrigated out with 500 mL of saline irrigation. The   gallbladder was removed from the 12 mm EndoCatch bag from the   periumbilical port. We then looked back into the right upper quadrant   and made sure everything was hemostatic, it was, and washed out just   a little bit more in the right upper quadrant, everything was clear. We   then closed the 12 mm periumbilical trocar site fascial defect with 0   Vicryl suture on an Endo Close suture passing device in an interrupted   figure-of-eight fashion. We then desufflated the abdominal cavity,   removed the trocars, and closed the skin with 4-0 Monocryl and   Dermabond to complete the operation. Dr. Carolyn Cruz was present and   scrubbed during the entire operation. The counts were correct. ANESTHESIA: General.    ESTIMATED BLOOD LOSS: None. SPECIMENS REMOVED: Gallbladder. FINDINGS: Numerous stones in the gallbladder. COMPLICATIONS: None.         Gerson Santizo MD      NL / [facility]   D:  11/13/2017   08:37   T:  11/13/2017   09:42   Job #:  [id number]

## 2017-11-13 NOTE — IP AVS SNAPSHOT
[address] 
[phone] Patient: Oumou Avendano MRN: [id number] About your hospitalization You were admitted on:  November 13, 2017 You last received care in the:  [facility] PACU You were discharged on:  November 13, 2017 Why you were hospitalized Your primary diagnosis was:  Not on File Things You Need To Do (next 8 weeks) Follow up with VERONICA Rodriguez Phone:  [phone] Where:  [address], [address] [facility], [place] [address] Schedule an appointment with Linsey Zayas MD as soon as possible for a visit in 2 week(s) For wound re-check Phone:  [phone] Where:  [address] [facility], [place] [address] [address] Monday Nov 27, 2017 POST OP with Maximo Santo NP at  9:00 AM  
Where:  [phone] ([facility]) Discharge Orders None A check leonel indicates which time of day the medication should be taken. My Medications TAKE these medications as instructed Instructions Each Dose to Equal  
 Morning Noon Evening Bedtime MINASTRIN 24 FE PO Your last dose was: Your next dose is: Take 1 Tab by mouth daily. 1 Tab  
    
   
   
   
  
 ondansetron 4 mg disintegrating tablet Commonly known as:  ZOFRAN ODT Your last dose was: Your next dose is: Take 1 Tab by mouth every eight (8) hours as needed for Nausea. 4 mg  
    
   
   
   
  
 oxyCODONE-acetaminophen 5-325 mg per tablet Commonly known as:  PERCOCET Your last dose was: Your next dose is: Take 1-2 Tabs by mouth every four (4) hours as needed for Pain. Max Daily Amount: 12 Tabs. 1-2 Tab Where to Get Your Medications Information on where to get these meds will be given to you by the nurse or doctor. ! Ask your nurse or doctor about these medications  
  ondansetron 4 mg disintegrating tablet  
 oxyCODONE-acetaminophen 5-325 mg per tablet Discharge Instructions Zofran given in PACU at 9:30 Laparoscopic cholecystectomy Patient Discharge Instructions Jodie Abrams / [id number] : 1995 Admitted 2017 Discharged: 2017 PATIENT INSTRUCTIONS 
GALLBLADDER SURGERY 
(CHOLECYSTECTOMY) FOLLOW-UP:  Please make an appointment with your physician in 10 - 14 day(s). Call your physician immediately if you have any fevers greater than 101.5, drainage from your wound that is not clear or looks infected, persistent bleeding, increasing abdominal pain, problems urinating, or persistent nausea/vomiting. You should be aware that you may have right shoulder pain after surgery and that this will progressively go away. This is called 'referred pain' and is from the area of the gallbladder. It can also be caused by gas that may be trapped under the diaphragm from the surgery, especially if it was performed laparoscopically through mini-incisions. This gas will progressively get reabsorbed by your body. WOUND CARE INSTRUCTIONS:   You may shower at home. If clothing rubs against the wound or causes irritation and the wound is not draining you may cover it with a dry dressing during the daytime. Try to keep the wound dry and avoid ointments on the wound unless directed to do so. If the wound becomes bright red and painful or starts to drain infected material that is not clear, please contact your physician immediately. You should also call if you begin to drain fluid that is thin and greenish-brown from the wound and appears to look like bile. If the wound though is mildly pink and has a thick firm ridge underneath it, this is normal, and is referred to as a healing ridge. This will resolve over the next 4-6 weeks. Place an ice pack on the navel incision for the next 48 hours. After that, you may use a heating pad if you feel muscle tightening or pulling. DIET:  You may eat any foods that you can tolerate. It is a good idea to eat a high fiber diet and take in plenty of fluids to prevent constipation. If you do become constipated you may want to take a mild laxative or take ducolax tablets on a daily basis until your bowel habits are regular. Constipation can be very uncomfortable, along with straining, after recent abdominal surgery. ACTIVITY:  You are encouraged to cough and deep breath or use your incentive spirometer if you were given one, every 15-30 minutes when awake. This will help prevent respiratory complications and low grade fevers post-operatively. You may want to hug a pillow when coughing and sneezing to add additional support to the surgical area(s) which will decrease pain during these times. You are encouraged to walk and engage in light activity for the next two weeks. You should not lift more than 20 pounds during this time frame as it could put you at increased risk for a post-operative hernia. Twenty pounds is roughly equivalent to a plastic bag of groceries. · Most people are able to return to work within 1 to 2 weeks after surgery. · You may shower 24 hours after surgery. Pat the cut (incision) dry. Do not take a bath for the first week. · Your doctor will tell you when you can have sex again. MEDICATIONS:  Try to take narcotic medications and anti-inflammatory medications, such as tylenol, ibuprofen, naprosyn, etc., with food. This will minimize stomach upset from the medication. Should you develop nausea and vomiting from the pain medication, or develop a rash, please discontinue the medication and contact your physician. You should not drive, make important decisions, or operate machinery when taking narcotic pain medication. · Take ibuprofen (Motrin) as scheduled then combine with oxycodone/acetaminophen (Percocet, Roxicet, Tylox) as needed for severe pain. QUESTIONS:  Please feel free to call Dr. Carmelita Mauro office ([phone]) if you have any questions, and they will be glad to assist you. Follow-up with Dr. Georgina Kong in 2 week(s). Call the office to schedule your appointment. Information obtained by : 
 
I understand that if any problems occur once I am at home I am to contact my physician. I understand and acknowledge receipt of the instructions indicated above. Physician's or R.N.'s Signature                                                                  Date/Time Patient or Representative Signature                                                          Date/Time 
  
 
 ______________________________________________________________________ Anesthesia Discharge Instructions After general anesthesia or intervenous sedation, for 24 hours or while taking prescription Narcotics: · Limit your activities · Do not drive or operate hazardous machinery · If you have not urinated within 8 hours after discharge, please contact your surgeon on call. · Do not make important personal or business decisions · Do not drink alcoholic beverages Report the following to your surgeon: 
· Excessive pain, swelling, redness or odor of or around the surgical area · Temperature over 100.5 degrees · Nausea and vomiting lasting longer than 4 hours or if unable to take medication · Any signs of decreased circulation or nerve impairment to extremity:  Change in color, persistent numbness, tingling, coldness or increased pain. · Any questions Introducing [facility]! Migue Samuels introduces [employer] patient portal. Now you can access parts of your medical record, email your doctor's office, and request medication refills online. 1. In your internet browser, go to [email or web address] 2. Click on the First Time User? Click Here link in the Sign In box. You will see the New Member Sign Up page. 3. Enter your [employer] Access Code exactly as it appears below. You will not need to use this code after youve completed the sign-up process. If you do not sign up before the expiration date, you must request a new code. · [employer] Access Code: [email or web address] Expires: 1/26/2018  9:40 AM 
 
4. Enter the last four digits of your Social Security Number (xxxx) and Date of Birth (mm/dd/yyyy) as indicated and click Submit. You will be taken to the next sign-up page. 5. Create a SocialGuides ID. This will be your SocialGuides login ID and cannot be changed, so think of one that is secure and easy to remember. 6. Create a SocialGuides password. You can change your password at any time. 7. Enter your Password Reset Question and Answer. This can be used at a later time if you forget your password. 8. Enter your e-mail address. You will receive e-mail notification when new information is available in [address] 9. Click Sign Up. You can now view and download portions of your medical record. 10. Click the Download Summary menu link to download a portable copy of your medical information. If you have questions, please visit the Frequently Asked Questions section of the [employer] website. Remember, [employer]ides is NOT to be used for urgent needs. For medical emergencies, dial 911. Now available from your iPhone and Android! Providers Seen During Your Hospitalization Provider Specialty Primary office phone Stephany Thayer, [address] [facility] [phone] Your Primary Care Physician (PCP) Primary Care Physician Office Phone Office Fax JOVON TREVINO [place] [address] [phone] You are allergic to the following No active allergies Recent Documentation Height Weight BMI OB Status Smoking Status 1.575 m 63.5 kg 25.61 kg/m2 Having regular periods Former Smoker Emergency Contacts Name Discharge Info Relation Home Work Mobile [address] CAREGIVER [3] Mother [14] [phone] Patient Belongings The following personal items are in your possession at time of discharge: 
  Dental Appliances: None  Visual Aid: Glasses, Contacts      Home Medications: None   Jewelry: None  Clothing:  (bag of clothes and glasses returned in PACU)    Other Valuables: Eyeglasses (to PACU) Discharge Instructions Attachments/References MEFS - OXYCODONE/ACETAMINOPHEN (PERCOCET, ROXICET) - (BY MOUTH) (ENGLISH) MEFS - ONDANSETRON (ZOFRAN, BD SIMPLIST ONDANSETRON, NOVAPLUS ONDANSETRON, PREMIERPRO RX ONDANSETRON) - (BY INJECTION) (ENGLISH) Patient Handouts Oxycodone/Acetaminophen (Percocet, Roxicet) - (By mouth) Why this medicine is used:  
Treats pain. This medicine contains a narcotic pain reliever. Contact a nurse or doctor right away if you have: 
· Extreme weakness, shallow breathing, slow heartbeat · Sweating or cold, clammy skin · Skin blisters, rash, or peeling Common side effects: 
· Constipation · Nausea, vomiting · Tiredness © 2017 [facility] Information is for End User's use only and may not be sold, redistributed or otherwise used for commercial purposes. Ondansetron (Zofran, BD Simplist Ondansetron, Novaplus Ondansetron, PremierPro Rx Ondansetron) - (By injection) Why this medicine is used:  
Prevents nausea and vomiting. Contact a nurse or doctor right away if you have: 
· Fast, pounding, or uneven heartbeat · Lightheadedness or fainting Common side effects: 
· Pain, swelling, itching, irritation where the needle is placed · Fever · Headache, tiredness · Constipation, diarrhea © 2017 [address] Information is for End User's use only and may not be sold, redistributed or otherwise used for commercial purposes. Please provide this summary of care documentation to your next provider. Signatures-by signing, you are acknowledging that this After Visit Summary has been reviewed with you and you have received a copy. Patient Signature:  ____________________________________________________________ Date:  ____________________________________________________________  
  
Nayan Palencia Provider Signature:  ____________________________________________________________ Date:  ____________________________________________________________

## 2017-11-13 NOTE — PERIOP NOTES
Patient: Masoud Loja MRN: [id number]  SSN: [id number]   YOB: 1995  Age: 25 y.o. Sex: female     Patient is status post Procedure(s):  LAPAROSCOPIC CHOLECYSTECTOMY.     Surgeon(s) and Role:     * Trae Allison MD - Primary    Local/Dose/Irrigation:  See [facility]                  Peripheral IV 11/13/17 Hand (Active)            Airway - Endotracheal Tube 11/13/17 Oral (Active)                   Dressing/Packing:  Wound Abdomen-DRESSING TYPE: Topical skin adhesive/glue (Dermabond on the 4 trocar sites) (11/13/17 0700)  Splint/Cast:  ]    Other:

## 2017-11-13 NOTE — H&P (VIEW-ONLY)
[facility] History and Physical    History of Present Illness:      Bruno Savage is a 25 y.o. female who has been having epigastric and RUQ abdominal pain. The pain started a few months ago but has become more painful recently. The pain is a 8 fo 10 when she has it. The pain seems to occur randomly and not always associated with food. She has some nausea with the pain but no vomiting. She was recently seen in the ED for this pain. The pain will last a few hours and then go away. PMH - none    PSH - none    Current Outpatient Prescriptions:     NORETHINDRONE-E.ESTRADIOL-IRON (MINASTRIN 24 FE PO), Take 1 Tab by mouth daily. , Disp: , Rfl:     oxyCODONE-acetaminophen (PERCOCET) 5-325 mg per tablet, Take 1 Tab by mouth every four (4) hours as needed for Pain. Max Daily Amount: 6 Tabs., Disp: 10 Tab, Rfl: 0    No Known Allergies    Social History     Social History    Marital status: SINGLE     Spouse name: N/A    Number of children: N/A    Years of education: N/A     Occupational History    Not on file.      Social History Main Topics    Smoking status: Never Smoker    Smokeless tobacco: Never Used    Alcohol use No    Drug use: No    Sexual activity: Yes     Partners: Male     Birth control/ protection: Condom, Injection     Other Topics Concern    Not on file     Social History Narrative       Family History   Problem Relation Age of Onset   Joe Anne Cancer Father     Hypertension Father     Cancer Maternal Grandmother     Hypertension Maternal Grandmother        ROS   Constitutional: negative  Ears, Nose, Mouth, Throat, and Face: negative  Respiratory: negative  Cardiovascular: negative  Gastrointestinal: positive for nausea and abdominal pain  Genitourinary:negative  Integument/Breast: negative  Hematologic/Lymphatic: negative  Behavioral/Psychiatric: negative  Allergic/Immunologic: negative      Physical Exam:     Visit Vitals    /62    Temp 98.9 °F (37.2 °C)    Resp 18    Ht 5' 2\" (1.575 m)    Wt 139 lb (63 kg)    BMI 25.42 kg/m2       General - alert and oriented, no apparent distress  HEENT - no jaundice, no hearing imparement  Pulm - CTAB, no C/W/R  CV - RRR, no M/R/G  Abd - soft, ND, BS present, NTTP, no hernia  Ext - pulses intact in UE and LE bilaterally, no edema  Skin - supple, no rashes  Psychiatric - normal affect, good mood    Labs  Lab Results   Component Value Date/Time    Sodium 137 10/28/2017 08:20 AM    Potassium 5.0 10/28/2017 08:20 AM    Chloride 106 10/28/2017 08:20 AM    CO2 24 10/28/2017 08:20 AM    Anion gap 7 10/28/2017 08:20 AM    Glucose 85 10/28/2017 08:20 AM    BUN 9 10/28/2017 08:20 AM    Creatinine 0.82 10/28/2017 08:20 AM    BUN/Creatinine ratio 11 10/28/2017 08:20 AM    GFR est AA >60 10/28/2017 08:20 AM    GFR est non-AA >60 10/28/2017 08:20 AM    Calcium 8.8 10/28/2017 08:20 AM    Bilirubin, total 0.5 10/28/2017 08:20 AM    AST (SGOT) 50 10/28/2017 08:20 AM    Alk. phosphatase 34 10/28/2017 08:20 AM    Protein, total 7.8 10/28/2017 08:20 AM    Albumin 3.7 10/28/2017 08:20 AM    Globulin 4.1 10/28/2017 08:20 AM    A-G Ratio 0.9 10/28/2017 08:20 AM    ALT (SGPT) 29 10/28/2017 08:20 AM     Lab Results   Component Value Date/Time    WBC 7.8 10/28/2017 08:20 AM    Hemoglobin (POC) 14.2 08/01/2012 09:38 AM    HGB 13.8 10/28/2017 08:20 AM    HCT 40.5 10/28/2017 08:20 AM    PLATELET 001 55/13/7988 08:20 AM    MCV 85.6 10/28/2017 08:20 AM         Imaging  RUQ US - The liver is  normal. The common bile duct is normal measuring 2.5 mm in diameter. The  gallbladder contains gallstones. The sonographic Yuri Samples sign is absent. There is  no evidence of cholecystitis. The right kidney measures 9.6 cm in length. There  is no hydronephrosis or visible ascites. I have reviewed and agree with all of the pertinent images    Assessment:     Mauricio Dent is a 25 y.o. female with symptomatic cholelithiasis    Recommendations:     1.    She does have pain cause by gallstones but no infection of the GB. She will need laparoscopic cholecystectomy in the OR. I have discussed the above procedure with the patient in detail. We reviewed the benefits and possible complications of the surgery which include bleeding, infection, damage to adjacent organs, venous thromboembolism, need for repeat surgery, death and other unforseen complications. The patient agreed to proceed with the surgery. Renu Maharaj MD    Ms. Lou Nieto has a reminder for a \"due or due soon\" health maintenance. I have asked that she contact her primary care provider for follow-up on this health maintenance.

## 2017-11-13 NOTE — DISCHARGE INSTRUCTIONS
Zofran given in PACU at 9:30    Laparoscopic cholecystectomy      Patient Discharge Instructions    Yue Pappas / [id number] : 1995    Admitted 2017 Discharged: 2017       PATIENT INSTRUCTIONS  GALLBLADDER SURGERY  (CHOLECYSTECTOMY)    FOLLOW-UP:  Please make an appointment with your physician in 10 - 14 day(s). Call your physician immediately if you have any fevers greater than 101.5, drainage from your wound that is not clear or looks infected, persistent bleeding, increasing abdominal pain, problems urinating, or persistent nausea/vomiting. You should be aware that you may have right shoulder pain after surgery and that this will progressively go away. This is called 'referred pain' and is from the area of the gallbladder. It can also be caused by gas that may be trapped under the diaphragm from the surgery, especially if it was performed laparoscopically through mini-incisions. This gas will progressively get reabsorbed by your body. WOUND CARE INSTRUCTIONS:   You may shower at home. If clothing rubs against the wound or causes irritation and the wound is not draining you may cover it with a dry dressing during the daytime. Try to keep the wound dry and avoid ointments on the wound unless directed to do so. If the wound becomes bright red and painful or starts to drain infected material that is not clear, please contact your physician immediately. You should also call if you begin to drain fluid that is thin and greenish-brown from the wound and appears to look like bile. If the wound though is mildly pink and has a thick firm ridge underneath it, this is normal, and is referred to as a healing ridge. This will resolve over the next 4-6 weeks. Place an ice pack on the navel incision for the next 48 hours. After that, you may use a heating pad if you feel muscle tightening or pulling. DIET:  You may eat any foods that you can tolerate.   It is a good idea to eat a high fiber diet and take in plenty of fluids to prevent constipation. If you do become constipated you may want to take a mild laxative or take ducolax tablets on a daily basis until your bowel habits are regular. Constipation can be very uncomfortable, along with straining, after recent abdominal surgery. ACTIVITY:  You are encouraged to cough and deep breath or use your incentive spirometer if you were given one, every 15-30 minutes when awake. This will help prevent respiratory complications and low grade fevers post-operatively. You may want to hug a pillow when coughing and sneezing to add additional support to the surgical area(s) which will decrease pain during these times. You are encouraged to walk and engage in light activity for the next two weeks. You should not lift more than 20 pounds during this time frame as it could put you at increased risk for a post-operative hernia. Twenty pounds is roughly equivalent to a plastic bag of groceries. · Most people are able to return to work within 1 to 2 weeks after surgery. · You may shower 24 hours after surgery. Pat the cut (incision) dry. Do not take a bath for the first week. · Your doctor will tell you when you can have sex again. MEDICATIONS:  Try to take narcotic medications and anti-inflammatory medications, such as tylenol, ibuprofen, naprosyn, etc., with food. This will minimize stomach upset from the medication. Should you develop nausea and vomiting from the pain medication, or develop a rash, please discontinue the medication and contact your physician. You should not drive, make important decisions, or operate machinery when taking narcotic pain medication. · Take ibuprofen (Motrin) as scheduled then combine with oxycodone/acetaminophen (Percocet, Roxicet, Tylox) as needed for severe pain.       QUESTIONS:  Please feel free to call Dr. Kay Hope office ([phone]) if you have any questions, and they will be glad to assist you.      Follow-up with Dr. Brsisa Cruz in 2 week(s). Call the office to schedule your appointment. Information obtained by :    I understand that if any problems occur once I am at home I am to contact my physician. I understand and acknowledge receipt of the instructions indicated above. Physician's or R.N.'s Signature                                                                  Date/Time                                                                                                                                              Patient or Representative Signature                                                          Date/Time        ______________________________________________________________________    Anesthesia Discharge Instructions    After general anesthesia or intervenous sedation, for 24 hours or while taking prescription Narcotics:  · Limit your activities  · Do not drive or operate hazardous machinery  · If you have not urinated within 8 hours after discharge, please contact your surgeon on call. · Do not make important personal or business decisions  · Do not drink alcoholic beverages    Report the following to your surgeon:  · Excessive pain, swelling, redness or odor of or around the surgical area  · Temperature over 100.5 degrees  · Nausea and vomiting lasting longer than 4 hours or if unable to take medication  · Any signs of decreased circulation or nerve impairment to extremity:  Change in color, persistent numbness, tingling, coldness or increased pain.   · Any questions

## 2017-11-13 NOTE — INTERVAL H&P NOTE
H&P Update:  Marquita Arthur was seen and examined. History and physical has been reviewed. The patient has been examined. There have been no significant clinical changes since the completion of the originally dated History and Physical.  Patient identified by surgeon; surgical site was confirmed by patient and surgeon.     Signed By: Brian Gilliland MD     November 13, 2017 7:26 AM

## 2017-11-16 NOTE — ANESTHESIA POSTPROCEDURE EVALUATION
Post-Anesthesia Evaluation and Assessment    Patient: James Alba MRN: [id number]  SSN: [id number]    YOB: 1995  Age: 25 y.o. Sex: female       Cardiovascular Function/Vital Signs  Visit Vitals    /61    Pulse 99    Temp 36.4 °C (97.6 °F)    Resp 15    Ht 5' 2\" (1.575 m)    Wt 63.5 kg (140 lb)    SpO2 100%    BMI 25.61 kg/m2       Patient is status post general anesthesia for Procedure(s):  LAPAROSCOPIC CHOLECYSTECTOMY. Nausea/Vomiting: None    Postoperative hydration reviewed and adequate. Pain:  Pain Scale 1: Numeric (0 - 10) (11/13/17 0926)  Pain Intensity 1: 2 (11/13/17 0926)   Managed    Neurological Status:   Neuro (WDL): Within Defined Limits (11/13/17 0926)  Neuro  Neurologic State: Alert (11/13/17 0926)  LUE Motor Response: Purposeful (11/13/17 0852)  LLE Motor Response: Purposeful (11/13/17 0852)  RUE Motor Response: Purposeful (11/13/17 8490)  RLE Motor Response: Purposeful (11/13/17 0852)   At baseline    Mental Status and Level of Consciousness: Arousable    Pulmonary Status:   O2 Device: Room air (11/13/17 0926)   Adequate oxygenation and airway patent    Complications related to anesthesia: None    Post-anesthesia assessment completed.  No concerns    Signed By: Kody Rausch MD     November 16, 2017

## 2017-11-27 ENCOUNTER — OFFICE VISIT (OUTPATIENT)
Dept: SURGERY | Age: 22
End: 2017-11-27

## 2017-11-27 VITALS
RESPIRATION RATE: 16 BRPM | OXYGEN SATURATION: 98 % | SYSTOLIC BLOOD PRESSURE: 122 MMHG | TEMPERATURE: 98.3 F | BODY MASS INDEX: 26.06 KG/M2 | HEIGHT: 62 IN | HEART RATE: 93 BPM | DIASTOLIC BLOOD PRESSURE: 78 MMHG | WEIGHT: 141.6 LBS

## 2017-11-27 DIAGNOSIS — Z09 POSTOPERATIVE EXAMINATION: Primary | ICD-10-CM

## 2017-11-27 NOTE — MR AVS SNAPSHOT
Visit Information Date & Time Provider Department Dept. Phone Encounter #  
 11/27/2017  9:00 AM Chapincito Vera NP [place] [id number] [phone] [id number] Upcoming Health Maintenance Date Due  
 HPV AGE 9Y-34Y (1 of 3 - Female 3 Dose Series) 8/7/2006 DTaP/Tdap/Td series (7 - Td) 1/9/2016 PAP AKA CERVICAL CYTOLOGY 8/7/2016 Influenza Age 5 to Adult 8/1/2017 Allergies as of 11/27/2017  Review Complete On: 11/27/2017 By: Chapincito Vera NP No Known Allergies Current Immunizations  Reviewed on 8/1/2012 Name Date DTAP Vaccine 10/12/2000, 9/9/1997, 10/12/1996, 5/16/1996, 2/2/1996 HIB Vaccine 5/16/1996, 2/22/1996, 1995 Hepatitis B Vaccine 12/9/1996, 5/16/1996, 1995, 1995 IPV 10/12/2000, 2/19/1997, 5/16/1996, 2/22/1996, 1995 MMR Vaccine 10/12/2000, 2/19/1997 PPD 11/12/1996 TDAP Vaccine 1/9/2006 Varicella Virus Vaccine Live 8/1/2012, 1/27/2006 Not reviewed this visit You Were Diagnosed With   
  
 Codes Comments Postoperative examination    -  Primary ICD-10-CM: R80 ICD-9-CM: V67.00 Vitals BP Pulse Temp Resp Height(growth percentile) Weight(growth percentile) 122/78 (BP 1 Location: Right arm, BP Patient Position: Sitting) 93 98.3 °F (36.8 °C) (Oral) 16 5' 2\" (1.575 m) 141 lb 9.6 oz (64.2 kg) LMP SpO2 BMI OB Status Smoking Status 11/02/2017 98% 25.9 kg/m2 Having regular periods Former Smoker Vitals History BMI and BSA Data Body Mass Index Body Surface Area  
 25.9 kg/m 2 1.68 m 2 Preferred Pharmacy Pharmacy Name Phone [facility] [address] AT [address] [place] [address] Drive [phone] Your Updated Medication List  
  
   
This list is accurate as of: 11/27/17  9:57 AM.  Always use your most recent med list.  
  
  
  
  
 MINASTRIN 24 FE PO Take 1 Tab by mouth daily. ondansetron 4 mg disintegrating tablet Commonly known as:  ZOFRAN ODT Take 1 Tab by mouth every eight (8) hours as needed for Nausea. oxyCODONE-acetaminophen 5-325 mg per tablet Commonly known as:  PERCOCET Take 1-2 Tabs by mouth every four (4) hours as needed for Pain. Max Daily Amount: 12 Tabs. Patient Instructions Cholecystectomy: What to Expect at [facility] Your Recovery After your surgery, it is normal to feel weak and tired for several days after you return home. Your belly may be swollen. If you had laparoscopic surgery, you may also have pain in your shoulder for about 24 hours. You may have gas or need to burp a lot at first, and a few people get diarrhea. The diarrhea usually goes away in 2 to 4 weeks, but it may last longer. How quickly you recover depends on whether you had a laparoscopic or open surgery. · For a laparoscopic surgery, most people can go back to work or their normal routine in 1 to 2 weeks, but it may take longer, depending on the type of work you do. · For an open surgery, it will probably take 4 to 6 weeks before you get back to your normal routine. This care sheet gives you a general idea about how long it will take for you to recover. However, each person recovers at a different pace. Follow the steps below to get better as quickly as possible. How can you care for yourself at home? Activity ? · Rest when you feel tired. Getting enough sleep will help you recover. ? · Try to walk each day. Start out by walking a little more than you did the day before. Gradually increase the amount you walk. Walking boosts blood flow and helps prevent pneumonia and constipation. ? · For about 2 to 4 weeks, avoid lifting anything that would make you strain. This may include a child, heavy grocery bags and milk containers, a heavy briefcase or backpack, cat litter or dog food bags, or a vacuum . ? · Avoid strenuous activities, such as biking, jogging, weightlifting, and aerobic exercise, until your doctor says it is okay. ? · You may shower 24 to 48 hours after surgery, if your doctor okays it. Pat the cut (incision) dry. Do not take a bath for the first 2 weeks, or until your doctor tells you it is okay. ? · You may drive when you are no longer taking pain medicine and can quickly move your foot from the gas pedal to the brake. You must also be able to sit comfortably for a long period of time, even if you do not plan to go far. You might get caught in traffic. ? · For a laparoscopic surgery, most people can go back to work or their normal routine in 1 to 2 weeks, but it may take longer. For an open surgery, it will probably take 4 to 6 weeks before you get back to your normal routine. ? · Your doctor will tell you when you can have sex again. ? Diet ? · Eat smaller meals more often instead of fewer larger meals. You can eat a normal diet, but avoid eating fatty foods for about 1 month. Fatty foods include hamburger, whole milk, cheese, and many snack foods. If your stomach is upset, try bland, low-fat foods like plain rice, broiled chicken, toast, and yogurt. ? · Drink plenty of fluids (unless your doctor tells you not to). ? · If you have diarrhea, try avoiding spicy foods, dairy products, fatty foods, and alcohol. You can also watch to see if specific foods cause it, and stop eating them. If the diarrhea continues for more than 2 weeks, talk to your doctor. ? · You may notice that your bowel movements are not regular right after your surgery. This is common. Try to avoid constipation and straining with bowel movements. You may want to take a fiber supplement every day. If you have not had a bowel movement after a couple of days, ask your doctor about taking a mild laxative. Medicines ? · Your doctor will tell you if and when you can restart your medicines. He or she will also give you instructions about taking any new medicines. ? · If you take blood thinners, such as warfarin (Coumadin), clopidogrel (Plavix), or aspirin, be sure to talk to your doctor. He or she will tell you if and when to start taking those medicines again. Make sure that you understand exactly what your doctor wants you to do. ? · Take pain medicines exactly as directed. ¨ If the doctor gave you a prescription medicine for pain, take it as prescribed. ¨ If you are not taking a prescription pain medicine, take an over-the-counter medicine such as acetaminophen (Tylenol), ibuprofen (Advil, Motrin), or naproxen (Aleve). Read and follow all instructions on the label. ¨ Do not take two or more pain medicines at the same time unless the doctor told you to. Many pain medicines contain acetaminophen, which is Tylenol. Too much Tylenol can be harmful. ? · If you think your pain medicine is making you sick to your stomach: 
¨ Take your medicine after meals (unless your doctor tells you not to). ¨ Ask your doctor for a different pain medicine. ? · If your doctor prescribed antibiotics, take them as directed. Do not stop taking them just because you feel better. You need to take the full course of antibiotics. Incision care ? · If you have strips of tape on the incision, or cut, leave the tape on for a week or until it falls off.  
? · After 24 to 48 hours, wash the area daily with warm, soapy water, and pat it dry. ? · You may have staples to hold the cut together. Keep them dry until your doctor takes them out. This is usually in 7 to 10 days. ? · Keep the area clean and dry. You may cover it with a gauze bandage if it weeps or rubs against clothing. Change the bandage every day. ?Ice ? · To reduce swelling and pain, put ice or a cold pack on your belly for 10 to 20 minutes at a time. Do this every 1 to 2 hours. Put a thin cloth between the ice and your skin. Follow-up care is a key part of your treatment and safety. Be sure to make and go to all appointments, and call your doctor if you are having problems. It's also a good idea to know your test results and keep a list of the medicines you take. When should you call for help? Call 911 anytime you think you may need emergency care. For example, call if: 
? · You passed out (lost consciousness). ? · You are short of breath. Dez Otero ? Call your doctor now or seek immediate medical care if: 
? · You are sick to your stomach and cannot drink fluids. ? · You have pain that does not get better when you take your pain medicine. ? · You cannot pass stools or gas. ? · You have signs of infection, such as: 
¨ Increased pain, swelling, warmth, or redness. ¨ Red streaks leading from the incision. ¨ Pus draining from the incision. ¨ A fever. ? · Bright red blood has soaked through the bandage over your incision. ? · You have loose stitches, or your incision comes open. ? · You have signs of a blood clot in your leg (called a deep vein thrombosis), such as: 
¨ Pain in your calf, back of knee, thigh, or groin. ¨ Redness and swelling in your leg or groin. ? Watch closely for any changes in your health, and be sure to contact your doctor if you have any problems. Where can you learn more? Go to http://arlene-eddie.info/. Enter 874 19 271 in the search box to learn more about \"Cholecystectomy: What to Expect at Home. \" Current as of: May 12, 2017 Content Version: 11.4 © 1949-9329 Healthwise, Incorporated. Care instructions adapted under license by [employer] (which disclaims liability or warranty for this information). If you have questions about a medical condition or this instruction, always ask your healthcare professional. [address] any warranty or liability for your use of this information. Introducing \[facility]\"" & HEALTH SERVICES! [facility] introduces Brainiac TV patient portal. Now you can access parts of your medical record, email your doctor's office, and request medication refills online. 1. In your internet browser, go to [email or web address] 2. Click on the First Time User? Click Here link in the Sign In box. You will see the New Member Sign Up page. 3. Enter your Brainiac TV Access Code exactly as it appears below. You will not need to use this code after youve completed the sign-up process. If you do not sign up before the expiration date, you must request a new code. · Brainiac TV Access Code: [email or web address] Expires: 1/26/2018  9:40 AM 
 
4. Enter the last four digits of your Social Security Number (xxxx) and Date of Birth (mm/dd/yyyy) as indicated and click Submit. You will be taken to the next sign-up page. 5. Create a Brainiac TV ID. This will be your Brainiac TV login ID and cannot be changed, so think of one that is secure and easy to remember. 6. Create a Brainiac TV password. You can change your password at any time. 7. Enter your Password Reset Question and Answer. This can be used at a later time if you forget your password. 8. Enter your e-mail address. You will receive e-mail notification when new information is available in [address] 9. Click Sign Up. You can now view and download portions of your medical record. 10. Click the Download Summary menu link to download a portable copy of your medical information. If you have questions, please visit the Frequently Asked Questions section of the Brainiac TV website. Remember, Brainiac TV is NOT to be used for urgent needs. For medical emergencies, dial 911. Now available from your iPhone and Android! Please provide this summary of care documentation to your next provider. Your primary care clinician is listed as Sobeida Campoverde. If you have any questions after today's visit, please call [phone].

## 2017-11-27 NOTE — PATIENT INSTRUCTIONS
Cholecystectomy: What to Expect at [address] [place] [address]  After your surgery, it is normal to feel weak and tired for several days after you return home. Your belly may be swollen. If you had laparoscopic surgery, you may also have pain in your shoulder for about 24 hours. You may have gas or need to burp a lot at first, and a few people get diarrhea. The diarrhea usually goes away in 2 to 4 weeks, but it may last longer. How quickly you recover depends on whether you had a laparoscopic or open surgery. · For a laparoscopic surgery, most people can go back to work or their normal routine in 1 to 2 weeks, but it may take longer, depending on the type of work you do. · For an open surgery, it will probably take 4 to 6 weeks before you get back to your normal routine. This care sheet gives you a general idea about how long it will take for you to recover. However, each person recovers at a different pace. Follow the steps below to get better as quickly as possible. How can you care for yourself at home? Activity  ? · Rest when you feel tired. Getting enough sleep will help you recover. ? · Try to walk each day. Start out by walking a little more than you did the day before. Gradually increase the amount you walk. Walking boosts blood flow and helps prevent pneumonia and constipation. ? · For about 2 to 4 weeks, avoid lifting anything that would make you strain. This may include a child, heavy grocery bags and milk containers, a heavy briefcase or backpack, cat litter or dog food bags, or a vacuum . ? · Avoid strenuous activities, such as biking, jogging, weightlifting, and aerobic exercise, until your doctor says it is okay. ? · You may shower 24 to 48 hours after surgery, if your doctor okays it. Pat the cut (incision) dry. Do not take a bath for the first 2 weeks, or until your doctor tells you it is okay.    ? · You may drive when you are no longer taking pain medicine and can quickly move your foot from the gas pedal to the brake. You must also be able to sit comfortably for a long period of time, even if you do not plan to go far. You might get caught in traffic. ? · For a laparoscopic surgery, most people can go back to work or their normal routine in 1 to 2 weeks, but it may take longer. For an open surgery, it will probably take 4 to 6 weeks before you get back to your normal routine. ? · Your doctor will tell you when you can have sex again. ? Diet  ? · Eat smaller meals more often instead of fewer larger meals. You can eat a normal diet, but avoid eating fatty foods for about 1 month. Fatty foods include hamburger, whole milk, cheese, and many snack foods. If your stomach is upset, try bland, low-fat foods like plain rice, broiled chicken, toast, and yogurt. ? · Drink plenty of fluids (unless your doctor tells you not to). ? · If you have diarrhea, try avoiding spicy foods, dairy products, fatty foods, and alcohol. You can also watch to see if specific foods cause it, and stop eating them. If the diarrhea continues for more than 2 weeks, talk to your doctor. ? · You may notice that your bowel movements are not regular right after your surgery. This is common. Try to avoid constipation and straining with bowel movements. You may want to take a fiber supplement every day. If you have not had a bowel movement after a couple of days, ask your doctor about taking a mild laxative. Medicines  ? · Your doctor will tell you if and when you can restart your medicines. He or she will also give you instructions about taking any new medicines. ? · If you take blood thinners, such as warfarin (Coumadin), clopidogrel (Plavix), or aspirin, be sure to talk to your doctor. He or she will tell you if and when to start taking those medicines again. Make sure that you understand exactly what your doctor wants you to do. ? · Take pain medicines exactly as directed.   ¨ If the doctor gave you a prescription medicine for pain, take it as prescribed. ¨ If you are not taking a prescription pain medicine, take an over-the-counter medicine such as acetaminophen (Tylenol), ibuprofen (Advil, Motrin), or naproxen (Aleve). Read and follow all instructions on the label. ¨ Do not take two or more pain medicines at the same time unless the doctor told you to. Many pain medicines contain acetaminophen, which is Tylenol. Too much Tylenol can be harmful. ? · If you think your pain medicine is making you sick to your stomach:  ¨ Take your medicine after meals (unless your doctor tells you not to). ¨ Ask your doctor for a different pain medicine. ? · If your doctor prescribed antibiotics, take them as directed. Do not stop taking them just because you feel better. You need to take the full course of antibiotics. Incision care  ? · If you have strips of tape on the incision, or cut, leave the tape on for a week or until it falls off.   ? · After 24 to 48 hours, wash the area daily with warm, soapy water, and pat it dry. ? · You may have staples to hold the cut together. Keep them dry until your doctor takes them out. This is usually in 7 to 10 days. ? · Keep the area clean and dry. You may cover it with a gauze bandage if it weeps or rubs against clothing. Change the bandage every day. ?Ice  ? · To reduce swelling and pain, put ice or a cold pack on your belly for 10 to 20 minutes at a time. Do this every 1 to 2 hours. Put a thin cloth between the ice and your skin. Follow-up care is a key part of your treatment and safety. Be sure to make and go to all appointments, and call your doctor if you are having problems. It's also a good idea to know your test results and keep a list of the medicines you take. When should you call for help? Call 911 anytime you think you may need emergency care. For example, call if:  ? · You passed out (lost consciousness). ? · You are short of breath. America Petersen ? Call your doctor now or seek immediate medical care if:  ? · You are sick to your stomach and cannot drink fluids. ? · You have pain that does not get better when you take your pain medicine. ? · You cannot pass stools or gas. ? · You have signs of infection, such as:  ¨ Increased pain, swelling, warmth, or redness. ¨ Red streaks leading from the incision. ¨ Pus draining from the incision. ¨ A fever. ? · Bright red blood has soaked through the bandage over your incision. ? · You have loose stitches, or your incision comes open. ? · You have signs of a blood clot in your leg (called a deep vein thrombosis), such as:  ¨ Pain in your calf, back of knee, thigh, or groin. ¨ Redness and swelling in your leg or groin. ? Watch closely for any changes in your health, and be sure to contact your doctor if you have any problems. Where can you learn more? Go to http://arlene-eddie.info/. Enter 225 80 492 in the search box to learn more about \"Cholecystectomy: What to Expect at Home. \"  Current as of: May 12, 2017  Content Version: 11.4  © 5153-2215 [employer]. Care instructions adapted under license by [employer] (which disclaims liability or warranty for this information). If you have questions about a medical condition or this instruction, always ask your healthcare professional. [address] any warranty or liability for your use of this information.

## 2017-11-27 NOTE — PROGRESS NOTES
Subjective:      Kirti Aquino is a 25 y.o. female presents for postop care following laparoscopic cholecystectomy on 11/13/2017   Appetite is good. Eating a regular diet without difficulty. She has vomited on Thanksgiving. She has been feeling well.,   Bowel movements are regular. The patient is voiding without difficulty. The patient is not having any pain. .    Patient has an advanced directive: not on file. Ms. Fernando Lee has a reminder for a \"due or due soon\" health maintenance. I have asked that she contact her primary care provider for follow-up on this health maintenance. Objective:     Visit Vitals    /78 (BP 1 Location: Right arm, BP Patient Position: Sitting)    Pulse 93    Temp 98.3 °F (36.8 °C) (Oral)    Resp 16    Ht 5' 2\" (1.575 m)    Wt 141 lb 9.6 oz (64.2 kg)    LMP 11/02/2017    SpO2 98%    BMI 25.9 kg/m2       General:  alert, cooperative, no distress   Abdomen: soft, bowel sounds active, non-tender   Incision:   healing well, no drainage, no erythema, no seroma, no swelling, no dehiscence, incision well approximated     Assessment:     Doing well postoperatively. Plan:     1. Follow up as needed  2. Path reviewed with patient  3. May return to work tomorrow without restriction. s   4. May increase activity as tolerated. Pt verbalized understanding and questions were answered to the best of my knowledge and ability.

## 2017-11-27 NOTE — PROGRESS NOTES
1. Have you been to the ER, urgent care clinic since your last visit? Hospitalized since your last visit? No    2. Have you seen or consulted any other health care providers outside of the [address] [place] [address] since your last visit? Include any pap smears or colon screening.  No

## 2017-11-28 ENCOUNTER — TELEPHONE (OUTPATIENT)
Dept: SURGERY | Age: 22
End: 2017-11-28

## 2017-11-28 NOTE — TELEPHONE ENCOUNTER
Left message that authorization for release of medical information needs to be signed for her [facility] paperwork.

## 2017-11-29 ENCOUNTER — TELEPHONE (OUTPATIENT)
Dept: SURGERY | Age: 22
End: 2017-11-29

## 2017-11-29 NOTE — TELEPHONE ENCOUNTER
Patient states she faxed authorization to release medical information yesterday. Made patient aware writer has not received it and as soon as it is received, paperwork will be faxed to [facility]  Patient states she will fax it again.

## 2017-11-30 ENCOUNTER — TELEPHONE (OUTPATIENT)
Dept: SURGERY | Age: 22
End: 2017-11-30

## 2018-06-16 ENCOUNTER — HOSPITAL ENCOUNTER (EMERGENCY)
Age: 23
Discharge: HOME | End: 2018-06-17
Attending: EMERGENCY MEDICINE | Admitting: EMERGENCY MEDICINE
Payer: COMMERCIAL

## 2018-06-16 ENCOUNTER — APPOINTMENT (OUTPATIENT)
Dept: CT IMAGING | Age: 23
End: 2018-06-16
Attending: EMERGENCY MEDICINE
Payer: COMMERCIAL

## 2018-06-16 DIAGNOSIS — R10.13 ABDOMINAL PAIN, EPIGASTRIC: Primary | ICD-10-CM

## 2018-06-16 DIAGNOSIS — I81 PORTAL VEIN THROMBOSIS: ICD-10-CM

## 2018-06-16 LAB
ALBUMIN SERPL-MCNC: 4 G/DL (ref 3.5–5)
ALBUMIN/GLOB SERPL: 1 {RATIO} (ref 1.1–2.2)
ALP SERPL-CCNC: 36 U/L (ref 45–117)
ALT SERPL-CCNC: 20 U/L (ref 12–78)
ANION GAP SERPL CALC-SCNC: 9 MMOL/L (ref 5–15)
AST SERPL-CCNC: 14 U/L (ref 15–37)
BASOPHILS # BLD: 0.1 K/UL (ref 0–0.1)
BASOPHILS NFR BLD: 1 % (ref 0–1)
BILIRUB SERPL-MCNC: 0.9 MG/DL (ref 0.2–1)
BUN SERPL-MCNC: 9 MG/DL (ref 6–20)
BUN/CREAT SERPL: 11 (ref 12–20)
CALCIUM SERPL-MCNC: 9 MG/DL (ref 8.5–10.1)
CHLORIDE SERPL-SCNC: 106 MMOL/L (ref 97–108)
CO2 SERPL-SCNC: 26 MMOL/L (ref 21–32)
CREAT SERPL-MCNC: 0.81 MG/DL (ref 0.55–1.02)
D DIMER PPP FEU-MCNC: 0.86 MG/L FEU (ref 0–0.65)
DIFFERENTIAL METHOD BLD: ABNORMAL
EOSINOPHIL # BLD: 0.1 K/UL (ref 0–0.4)
EOSINOPHIL NFR BLD: 1 % (ref 0–7)
ERYTHROCYTE [DISTWIDTH] IN BLOOD BY AUTOMATED COUNT: 13.2 % (ref 11.5–14.5)
GLOBULIN SER CALC-MCNC: 4.1 G/DL (ref 2–4)
GLUCOSE SERPL-MCNC: 81 MG/DL (ref 65–100)
HCG UR QL: NEGATIVE
HCT VFR BLD AUTO: 42.3 % (ref 35–47)
HGB BLD-MCNC: 13.8 G/DL (ref 11.5–16)
IMM GRANULOCYTES # BLD: 0 K/UL (ref 0–0.04)
IMM GRANULOCYTES NFR BLD AUTO: 0 % (ref 0–0.5)
LYMPHOCYTES # BLD: 4.4 K/UL (ref 0.8–3.5)
LYMPHOCYTES NFR BLD: 43 % (ref 12–49)
MCH RBC QN AUTO: 28.9 PG (ref 26–34)
MCHC RBC AUTO-ENTMCNC: 32.6 G/DL (ref 30–36.5)
MCV RBC AUTO: 88.5 FL (ref 80–99)
MONOCYTES # BLD: 0.5 K/UL (ref 0–1)
MONOCYTES NFR BLD: 5 % (ref 5–13)
NEUTS SEG # BLD: 5.3 K/UL (ref 1.8–8)
NEUTS SEG NFR BLD: 52 % (ref 32–75)
NRBC # BLD: 0 K/UL (ref 0–0.01)
NRBC BLD-RTO: 0 PER 100 WBC
PLATELET # BLD AUTO: 236 K/UL (ref 150–400)
PMV BLD AUTO: 10.6 FL (ref 8.9–12.9)
POTASSIUM SERPL-SCNC: 3.5 MMOL/L (ref 3.5–5.1)
PROT SERPL-MCNC: 8.1 G/DL (ref 6.4–8.2)
RBC # BLD AUTO: 4.78 M/UL (ref 3.8–5.2)
SODIUM SERPL-SCNC: 141 MMOL/L (ref 136–145)
WBC # BLD AUTO: 10.4 K/UL (ref 3.6–11)

## 2018-06-16 PROCEDURE — 85025 COMPLETE CBC W/AUTO DIFF WBC: CPT | Performed by: EMERGENCY MEDICINE

## 2018-06-16 PROCEDURE — 85379 FIBRIN DEGRADATION QUANT: CPT | Performed by: EMERGENCY MEDICINE

## 2018-06-16 PROCEDURE — 80053 COMPREHEN METABOLIC PANEL: CPT | Performed by: EMERGENCY MEDICINE

## 2018-06-16 PROCEDURE — 83690 ASSAY OF LIPASE: CPT | Performed by: EMERGENCY MEDICINE

## 2018-06-16 PROCEDURE — 36415 COLL VENOUS BLD VENIPUNCTURE: CPT | Performed by: EMERGENCY MEDICINE

## 2018-06-16 PROCEDURE — 71275 CT ANGIOGRAPHY CHEST: CPT

## 2018-06-16 PROCEDURE — 81025 URINE PREGNANCY TEST: CPT

## 2018-06-16 PROCEDURE — 93005 ELECTROCARDIOGRAM TRACING: CPT

## 2018-06-16 PROCEDURE — 99283 EMERGENCY DEPT VISIT LOW MDM: CPT

## 2018-06-16 PROCEDURE — 96361 HYDRATE IV INFUSION ADD-ON: CPT

## 2018-06-16 PROCEDURE — 74011250636 HC RX REV CODE- 250/636: Performed by: EMERGENCY MEDICINE

## 2018-06-16 PROCEDURE — 74177 CT ABD & PELVIS W/CONTRAST: CPT

## 2018-06-16 RX ORDER — SODIUM CHLORIDE 9 MG/ML
1000 INJECTION, SOLUTION INTRAVENOUS ONCE
Status: COMPLETED | OUTPATIENT
Start: 2018-06-16 | End: 2018-06-16

## 2018-06-16 RX ORDER — SODIUM CHLORIDE 0.9 % (FLUSH) 0.9 %
10 SYRINGE (ML) INJECTION
Status: COMPLETED | OUTPATIENT
Start: 2018-06-16 | End: 2018-06-17

## 2018-06-16 RX ORDER — SODIUM CHLORIDE 9 MG/ML
1000 INJECTION, SOLUTION INTRAVENOUS ONCE
Status: COMPLETED | OUTPATIENT
Start: 2018-06-16 | End: 2018-06-17

## 2018-06-16 RX ADMIN — SODIUM CHLORIDE 1000 ML: 900 INJECTION, SOLUTION INTRAVENOUS at 23:46

## 2018-06-16 RX ADMIN — SODIUM CHLORIDE 1000 ML: 900 INJECTION, SOLUTION INTRAVENOUS at 22:44

## 2018-06-17 VITALS
RESPIRATION RATE: 18 BRPM | HEIGHT: 62 IN | BODY MASS INDEX: 26.31 KG/M2 | HEART RATE: 88 BPM | WEIGHT: 143 LBS | TEMPERATURE: 98.6 F | DIASTOLIC BLOOD PRESSURE: 76 MMHG | OXYGEN SATURATION: 99 % | SYSTOLIC BLOOD PRESSURE: 117 MMHG

## 2018-06-17 LAB — LIPASE SERPL-CCNC: 220 U/L (ref 73–393)

## 2018-06-17 PROCEDURE — 74011000258 HC RX REV CODE- 258: Performed by: EMERGENCY MEDICINE

## 2018-06-17 PROCEDURE — 74011250636 HC RX REV CODE- 250/636: Performed by: EMERGENCY MEDICINE

## 2018-06-17 PROCEDURE — 96374 THER/PROPH/DIAG INJ IV PUSH: CPT

## 2018-06-17 PROCEDURE — 74011636320 HC RX REV CODE- 636/320: Performed by: EMERGENCY MEDICINE

## 2018-06-17 RX ORDER — ONDANSETRON 4 MG/1
4 TABLET, ORALLY DISINTEGRATING ORAL
Qty: 10 TAB | Refills: 0 | Status: SHIPPED | OUTPATIENT
Start: 2018-06-17 | End: 2019-02-01

## 2018-06-17 RX ORDER — FAMOTIDINE 20 MG/1
20 TABLET, FILM COATED ORAL 2 TIMES DAILY
Qty: 20 TAB | Refills: 0 | Status: SHIPPED | OUTPATIENT
Start: 2018-06-17 | End: 2019-02-01

## 2018-06-17 RX ORDER — ONDANSETRON 2 MG/ML
4 INJECTION INTRAMUSCULAR; INTRAVENOUS
Status: COMPLETED | OUTPATIENT
Start: 2018-06-17 | End: 2018-06-17

## 2018-06-17 RX ORDER — ONDANSETRON 2 MG/ML
INJECTION INTRAMUSCULAR; INTRAVENOUS
Status: DISCONTINUED
Start: 2018-06-17 | End: 2018-06-17 | Stop reason: HOSPADM

## 2018-06-17 RX ADMIN — Medication 10 ML: at 00:10

## 2018-06-17 RX ADMIN — IOPAMIDOL 100 ML: 755 INJECTION, SOLUTION INTRAVENOUS at 00:10

## 2018-06-17 RX ADMIN — ONDANSETRON 4 MG: 2 INJECTION INTRAMUSCULAR; INTRAVENOUS at 01:14

## 2018-06-17 RX ADMIN — SODIUM CHLORIDE 100 ML: 900 INJECTION, SOLUTION INTRAVENOUS at 00:10

## 2018-06-17 NOTE — DISCHARGE INSTRUCTIONS
Abdominal Pain: Care Instructions  Your Care Instructions    Abdominal pain has many possible causes. Some aren't serious and get better on their own in a few days. Others need more testing and treatment. If your pain continues or gets worse, you need to be rechecked and may need more tests to find out what is wrong. You may need surgery to correct the problem. Don't ignore new symptoms, such as fever, nausea and vomiting, urination problems, pain that gets worse, and dizziness. These may be signs of a more serious problem. Your doctor may have recommended a follow-up visit in the next 8 to 12 hours. If you are not getting better, you may need more tests or treatment. The doctor has checked you carefully, but problems can develop later. If you notice any problems or new symptoms, get medical treatment right away. Follow-up care is a key part of your treatment and safety. Be sure to make and go to all appointments, and call your doctor if you are having problems. It's also a good idea to know your test results and keep a list of the medicines you take. How can you care for yourself at home? · Rest until you feel better. · To prevent dehydration, drink plenty of fluids, enough so that your urine is light yellow or clear like water. Choose water and other caffeine-free clear liquids until you feel better. If you have kidney, heart, or liver disease and have to limit fluids, talk with your doctor before you increase the amount of fluids you drink. · If your stomach is upset, eat mild foods, such as rice, dry toast or crackers, bananas, and applesauce. Try eating several small meals instead of two or three large ones. · Wait until 48 hours after all symptoms have gone away before you have spicy foods, alcohol, and drinks that contain caffeine. · Do not eat foods that are high in fat. · Avoid anti-inflammatory medicines such as aspirin, ibuprofen (Advil, Motrin), and naproxen (Aleve).  These can cause stomach upset. Talk to your doctor if you take daily aspirin for another health problem. When should you call for help? Call 911 anytime you think you may need emergency care. For example, call if:  ? · You passed out (lost consciousness). ? · You pass maroon or very bloody stools. ? · You vomit blood or what looks like coffee grounds. ? · You have new, severe belly pain. ?Call your doctor now or seek immediate medical care if:  ? · Your pain gets worse, especially if it becomes focused in one area of your belly. ? · You have a new or higher fever. ? · Your stools are black and look like tar, or they have streaks of blood. ? · You have unexpected vaginal bleeding. ? · You have symptoms of a urinary tract infection. These may include:  ¨ Pain when you urinate. ¨ Urinating more often than usual.  ¨ Blood in your urine. ? · You are dizzy or lightheaded, or you feel like you may faint. ? Watch closely for changes in your health, and be sure to contact your doctor if:  ? · You are not getting better after 1 day (24 hours). Where can you learn more? Go to http://arlene-eddie.info/. Enter F083 in the search box to learn more about \"Abdominal Pain: Care Instructions. \"  Current as of: March 20, 2017  Content Version: 11.4  © 6865-4748 [employer]. Care instructions adapted under license by [employer] (which disclaims liability or warranty for this information). If you have questions about a medical condition or this instruction, always ask your healthcare professional. [place] [address] any warranty or liability for your use of this information.

## 2018-06-17 NOTE — ED TRIAGE NOTES
Pt presents with complaints of chest pain underneath left and right breast. Pt reports this pain has been constant since Tuesday. Pt reports that on Tuesday she felt like her heart was racing after walking up the stairs, this lasted approximately 1 hour and then she developed the pain which has remained constant since. Pt reports shortness of breath with exertion.

## 2018-06-17 NOTE — ED NOTES
MD reviewed discharge instructions and options with patient and patient verbalized understanding. RN reviewed discharge instructions using teachback method. Pt ambulated to exit without difficulty and in no signs of acute distress escorted by family, and she  will drive home. No complaints or needs expressed at this time. Patient was counseled on medications prescribed at discharge. VSS, verbalized relief from most intense pain. Patient to call Dr. Katarina Forbes in the morning for appointment.

## 2018-06-17 NOTE — ED PROVIDER NOTES
HPI Comments: 25-year-old female presents emergency room for evaluation of abdominal pain. Pain is located across the upper abdomen. Pain is been constant for 4 days. Pain waxes and wanes in intensity. Patient associated nausea but no vomiting. She has diarrhea. No dysuria frequency or urgency. No fever or chills. No chest pain or shortness of breath. No difficulty breathing. No back pain. No medicines have been taken prior to arrival. Pain is improved with lying. Pain is worse with walking. No cough congestion or sore throat. No complaints. Pain 4/10. Social hx  Nonsmoker  Social alcohol    Patient is a 25 y.o. female presenting with chest pain. The history is provided by the patient. Chest Pain (Angina)    Associated symptoms include abdominal pain and nausea. Pertinent negatives include no cough, no dizziness, no fever, no headaches, no numbness, no palpitations, no shortness of breath and no vomiting. Past Medical History:   Diagnosis Date    Asthma     CHILDHOOD    Hx laparoscopic cholecystectomy 11/13/2017    Abhishek Aaron     Ill-defined condition     pain and nausea associated with gallbladder       History reviewed. No pertinent surgical history. Family History:   Problem Relation Age of Onset    Cancer Maternal Grandmother      BREAST CA    Hypertension Maternal Grandmother     Cancer Maternal Grandfather      COLON AND LUNG CA    Hypertension Maternal Grandfather        Social History     Social History    Marital status: SINGLE     Spouse name: N/A    Number of children: N/A    Years of education: N/A     Occupational History    Not on file.      Social History Main Topics    Smoking status: Former Smoker     Packs/day: 0.50     Years: 3.00     Quit date: 11/8/2015    Smokeless tobacco: Never Used    Alcohol use 0.0 oz/week      Comment: SOCIAL ETOH    Drug use: No    Sexual activity: Yes     Partners: Male     Birth control/ protection: Condom, Injection Other Topics Concern    Not on file     Social History Narrative         ALLERGIES: Review of patient's allergies indicates no known allergies. Review of Systems   Constitutional: Negative for chills and fever. Respiratory: Negative for cough and shortness of breath. Cardiovascular: Negative for chest pain and palpitations. Gastrointestinal: Positive for abdominal pain and nausea. Negative for blood in stool, diarrhea and vomiting. Genitourinary: Negative for dysuria, flank pain, frequency and urgency. Musculoskeletal: Negative for arthralgias, myalgias, neck pain and neck stiffness. Skin: Negative for rash and wound. Neurological: Negative for dizziness, numbness and headaches. All other systems reviewed and are negative. Vitals:    06/16/18 2138 06/17/18 0120   BP: (!) 137/96 117/76   Pulse: 97 88   Resp: 18 18   Temp: 98.1 °F (36.7 °C) 98.6 °F (37 °C)   SpO2: 98% 99%   Weight: 64.9 kg (143 lb)    Height: 5' 2\" (1.575 m)             Physical Exam   Constitutional: She is oriented to person, place, and time. She appears well-developed and well-nourished. No distress. HENT:   Head: Normocephalic and atraumatic. Right Ear: External ear normal.   Left Ear: External ear normal.   Eyes: Conjunctivae and EOM are normal. Pupils are equal, round, and reactive to light. Neck: Normal range of motion. Neck supple. Cardiovascular: Normal rate and regular rhythm. Pulmonary/Chest: Effort normal and breath sounds normal. No respiratory distress. She has no wheezes. Abdominal: Soft. Normal appearance and bowel sounds are normal. She exhibits no shifting dullness, no distension, no pulsatile liver, no abdominal bruit, no pulsatile midline mass and no mass. There is no hepatosplenomegaly, splenomegaly or hepatomegaly. There is tenderness. There is no rigidity, no rebound, no guarding, no CVA tenderness, no tenderness at McBurney's point and negative Vega's sign.    Abdomen exposed for exam.  Soft, no peritoneal signs. Pt tender epigastrically. Musculoskeletal: Normal range of motion. She exhibits no edema or tenderness. Neurological: She is alert and oriented to person, place, and time. Coordination normal.   Skin: Skin is warm and dry. No rash noted. No erythema. Psychiatric: She has a normal mood and affect. Her behavior is normal. Judgment and thought content normal.   Nursing note and vitals reviewed. MDM  Number of Diagnoses or Management Options  Abdominal pain, epigastric:   Portal vein thrombosis:   Diagnosis management comments: 25 year female presenting for abdominal pain. She is tender epigastrically. Her gallbladder has been removed. There no peritoneal signs. Her lungs are clear. Pancreatitis, virus, gallbladder disease/retained stone, PE, musculoskeletal, ulcer, obstruction, ulcer. Plan: Labs, CT, pt declining pain medicine. 1:56 AM  Patient is well hydrated, well appearing, and in no respiratory distress. Physical exam is reassuring, and without signs of serious illness. Given the patient's history, clinical course, physical exam, and imaging findings, abdominal pain is unlikely secondary to a surgical etiology. No PE noted. Dicussed portal vein thrombis on CT. Patient will be discharged home with pain control and follow-up with primary care physician in one to two days. Patient and caregivers were instructed on signs and symptoms of reasons to return including fever, worsening pain, vomiting, blood in the stool or any other concerns. Patient's results have been reviewed with them. Patient and/or family have verbally conveyed their understanding and agreement of the patient's signs, symptoms, diagnosis, treatment and prognosis and additionally agree to follow up as recommended or return to the Emergency Room should their condition change prior to follow-up.   Discharge instructions have also been provided to the patient with some educational information regarding their diagnosis as well a list of reasons why they would want to return to the ER prior to their follow-up appointment should their condition change. Amount and/or Complexity of Data Reviewed  Discuss the patient with other providers: yes (ER attending-Mario)          ED Course       Procedures           Pt case including HPI, PE, and all available lab and radiology results has been discussed with attending physician. Opportunity to evaluate patient has been provided to ER attending. Discharge and prescription plan has been agreed upon.

## 2018-06-18 LAB
ATRIAL RATE: 74 BPM
CALCULATED P AXIS, ECG09: 60 DEGREES
CALCULATED R AXIS, ECG10: 62 DEGREES
CALCULATED T AXIS, ECG11: 28 DEGREES
DIAGNOSIS, 93000: NORMAL
P-R INTERVAL, ECG05: 132 MS
Q-T INTERVAL, ECG07: 342 MS
QRS DURATION, ECG06: 82 MS
QTC CALCULATION (BEZET), ECG08: 379 MS
VENTRICULAR RATE, ECG03: 74 BPM

## 2019-01-31 ENCOUNTER — ANESTHESIA EVENT (OUTPATIENT)
Dept: SURGERY | Age: 24
End: 2019-01-31
Payer: COMMERCIAL

## 2019-02-06 ENCOUNTER — ANESTHESIA (OUTPATIENT)
Dept: SURGERY | Age: 24
End: 2019-02-06
Payer: COMMERCIAL

## 2019-02-06 ENCOUNTER — HOSPITAL ENCOUNTER (OUTPATIENT)
Age: 24
Setting detail: OUTPATIENT SURGERY
Discharge: HOME | End: 2019-02-06
Attending: OBSTETRICS & GYNECOLOGY | Admitting: OBSTETRICS & GYNECOLOGY
Payer: COMMERCIAL

## 2019-02-06 VITALS
SYSTOLIC BLOOD PRESSURE: 114 MMHG | HEART RATE: 68 BPM | HEIGHT: 62 IN | RESPIRATION RATE: 18 BRPM | DIASTOLIC BLOOD PRESSURE: 62 MMHG | WEIGHT: 156 LBS | TEMPERATURE: 97.4 F | OXYGEN SATURATION: 99 % | BODY MASS INDEX: 28.71 KG/M2

## 2019-02-06 DIAGNOSIS — G89.18 POST-OP PAIN: Primary | ICD-10-CM

## 2019-02-06 PROCEDURE — 76210000006 HC OR PH I REC 0.5 TO 1 HR: Performed by: OBSTETRICS & GYNECOLOGY

## 2019-02-06 PROCEDURE — 76210000020 HC REC RM PH II FIRST 0.5 HR: Performed by: OBSTETRICS & GYNECOLOGY

## 2019-02-06 PROCEDURE — 74011250637 HC RX REV CODE- 250/637: Performed by: OBSTETRICS & GYNECOLOGY

## 2019-02-06 PROCEDURE — 76010000138 HC OR TIME 0.5 TO 1 HR: Performed by: OBSTETRICS & GYNECOLOGY

## 2019-02-06 PROCEDURE — 74011250636 HC RX REV CODE- 250/636

## 2019-02-06 PROCEDURE — 77030032490 HC SLV COMPR SCD KNE COVD -B: Performed by: OBSTETRICS & GYNECOLOGY

## 2019-02-06 PROCEDURE — 76060000032 HC ANESTHESIA 0.5 TO 1 HR: Performed by: OBSTETRICS & GYNECOLOGY

## 2019-02-06 PROCEDURE — 77030008578 HC TBNG UTER SUC BUSS -A: Performed by: OBSTETRICS & GYNECOLOGY

## 2019-02-06 PROCEDURE — 77030005537 HC CATH URETH BARD -A: Performed by: OBSTETRICS & GYNECOLOGY

## 2019-02-06 PROCEDURE — 88305 TISSUE EXAM BY PATHOLOGIST: CPT

## 2019-02-06 PROCEDURE — 74011000250 HC RX REV CODE- 250: Performed by: OBSTETRICS & GYNECOLOGY

## 2019-02-06 PROCEDURE — 77030020164: Performed by: OBSTETRICS & GYNECOLOGY

## 2019-02-06 PROCEDURE — 77030020782 HC GWN BAIR PAWS FLX 3M -B

## 2019-02-06 PROCEDURE — 77030003666 HC NDL SPINAL BD -A: Performed by: OBSTETRICS & GYNECOLOGY

## 2019-02-06 RX ORDER — MORPHINE SULFATE 10 MG/ML
2 INJECTION, SOLUTION INTRAMUSCULAR; INTRAVENOUS
Status: DISCONTINUED | OUTPATIENT
Start: 2019-02-06 | End: 2019-02-06 | Stop reason: HOSPADM

## 2019-02-06 RX ORDER — ACETAMINOPHEN 10 MG/ML
INJECTION, SOLUTION INTRAVENOUS AS NEEDED
Status: DISCONTINUED | OUTPATIENT
Start: 2019-02-06 | End: 2019-02-06 | Stop reason: HOSPADM

## 2019-02-06 RX ORDER — SODIUM CHLORIDE 0.9 % (FLUSH) 0.9 %
5-40 SYRINGE (ML) INJECTION EVERY 8 HOURS
Status: DISCONTINUED | OUTPATIENT
Start: 2019-02-06 | End: 2019-02-06 | Stop reason: HOSPADM

## 2019-02-06 RX ORDER — SODIUM CHLORIDE, SODIUM LACTATE, POTASSIUM CHLORIDE, CALCIUM CHLORIDE 600; 310; 30; 20 MG/100ML; MG/100ML; MG/100ML; MG/100ML
125 INJECTION, SOLUTION INTRAVENOUS CONTINUOUS
Status: DISCONTINUED | OUTPATIENT
Start: 2019-02-06 | End: 2019-02-06 | Stop reason: HOSPADM

## 2019-02-06 RX ORDER — SODIUM CHLORIDE 0.9 % (FLUSH) 0.9 %
5-40 SYRINGE (ML) INJECTION AS NEEDED
Status: DISCONTINUED | OUTPATIENT
Start: 2019-02-06 | End: 2019-02-06 | Stop reason: HOSPADM

## 2019-02-06 RX ORDER — DEXAMETHASONE SODIUM PHOSPHATE 4 MG/ML
INJECTION, SOLUTION INTRA-ARTICULAR; INTRALESIONAL; INTRAMUSCULAR; INTRAVENOUS; SOFT TISSUE AS NEEDED
Status: DISCONTINUED | OUTPATIENT
Start: 2019-02-06 | End: 2019-02-06 | Stop reason: HOSPADM

## 2019-02-06 RX ORDER — BUPIVACAINE HYDROCHLORIDE 5 MG/ML
INJECTION, SOLUTION EPIDURAL; INTRACAUDAL AS NEEDED
Status: DISCONTINUED | OUTPATIENT
Start: 2019-02-06 | End: 2019-02-06 | Stop reason: HOSPADM

## 2019-02-06 RX ORDER — FENTANYL CITRATE 50 UG/ML
50 INJECTION, SOLUTION INTRAMUSCULAR; INTRAVENOUS AS NEEDED
Status: DISCONTINUED | OUTPATIENT
Start: 2019-02-06 | End: 2019-02-06 | Stop reason: HOSPADM

## 2019-02-06 RX ORDER — ONDANSETRON 2 MG/ML
INJECTION INTRAMUSCULAR; INTRAVENOUS AS NEEDED
Status: DISCONTINUED | OUTPATIENT
Start: 2019-02-06 | End: 2019-02-06 | Stop reason: HOSPADM

## 2019-02-06 RX ORDER — IBUPROFEN 600 MG/1
600 TABLET ORAL
Qty: 30 TAB | Refills: 1 | Status: SHIPPED | OUTPATIENT
Start: 2019-02-06 | End: 2019-12-21

## 2019-02-06 RX ORDER — KETOROLAC TROMETHAMINE 30 MG/ML
INJECTION, SOLUTION INTRAMUSCULAR; INTRAVENOUS AS NEEDED
Status: DISCONTINUED | OUTPATIENT
Start: 2019-02-06 | End: 2019-02-06 | Stop reason: HOSPADM

## 2019-02-06 RX ORDER — SODIUM CHLORIDE, SODIUM LACTATE, POTASSIUM CHLORIDE, CALCIUM CHLORIDE 600; 310; 30; 20 MG/100ML; MG/100ML; MG/100ML; MG/100ML
INJECTION, SOLUTION INTRAVENOUS
Status: DISCONTINUED | OUTPATIENT
Start: 2019-02-06 | End: 2019-02-06 | Stop reason: HOSPADM

## 2019-02-06 RX ORDER — DOXYCYCLINE HYCLATE 100 MG
100 TABLET ORAL
Status: COMPLETED | OUTPATIENT
Start: 2019-02-06 | End: 2019-02-06

## 2019-02-06 RX ORDER — LIDOCAINE HYDROCHLORIDE 10 MG/ML
0.1 INJECTION, SOLUTION EPIDURAL; INFILTRATION; INTRACAUDAL; PERINEURAL AS NEEDED
Status: DISCONTINUED | OUTPATIENT
Start: 2019-02-06 | End: 2019-02-06 | Stop reason: HOSPADM

## 2019-02-06 RX ORDER — OXYCODONE AND ACETAMINOPHEN 5; 325 MG/1; MG/1
1 TABLET ORAL AS NEEDED
Status: DISCONTINUED | OUTPATIENT
Start: 2019-02-06 | End: 2019-02-06 | Stop reason: HOSPADM

## 2019-02-06 RX ORDER — MIDAZOLAM HYDROCHLORIDE 1 MG/ML
1 INJECTION, SOLUTION INTRAMUSCULAR; INTRAVENOUS AS NEEDED
Status: DISCONTINUED | OUTPATIENT
Start: 2019-02-06 | End: 2019-02-06 | Stop reason: HOSPADM

## 2019-02-06 RX ORDER — MIDAZOLAM HYDROCHLORIDE 1 MG/ML
INJECTION, SOLUTION INTRAMUSCULAR; INTRAVENOUS AS NEEDED
Status: DISCONTINUED | OUTPATIENT
Start: 2019-02-06 | End: 2019-02-06 | Stop reason: HOSPADM

## 2019-02-06 RX ORDER — LIDOCAINE HYDROCHLORIDE 20 MG/ML
INJECTION, SOLUTION EPIDURAL; INFILTRATION; INTRACAUDAL; PERINEURAL AS NEEDED
Status: DISCONTINUED | OUTPATIENT
Start: 2019-02-06 | End: 2019-02-06 | Stop reason: HOSPADM

## 2019-02-06 RX ORDER — MIDAZOLAM HYDROCHLORIDE 1 MG/ML
0.5 INJECTION, SOLUTION INTRAMUSCULAR; INTRAVENOUS
Status: DISCONTINUED | OUTPATIENT
Start: 2019-02-06 | End: 2019-02-06 | Stop reason: HOSPADM

## 2019-02-06 RX ORDER — FENTANYL CITRATE 50 UG/ML
INJECTION, SOLUTION INTRAMUSCULAR; INTRAVENOUS AS NEEDED
Status: DISCONTINUED | OUTPATIENT
Start: 2019-02-06 | End: 2019-02-06 | Stop reason: HOSPADM

## 2019-02-06 RX ORDER — OXYCODONE AND ACETAMINOPHEN 5; 325 MG/1; MG/1
1 TABLET ORAL
Qty: 5 TAB | Refills: 0 | Status: SHIPPED | OUTPATIENT
Start: 2019-02-06 | End: 2019-12-21

## 2019-02-06 RX ORDER — PROPOFOL 10 MG/ML
INJECTION, EMULSION INTRAVENOUS AS NEEDED
Status: DISCONTINUED | OUTPATIENT
Start: 2019-02-06 | End: 2019-02-06 | Stop reason: HOSPADM

## 2019-02-06 RX ORDER — ONDANSETRON 2 MG/ML
4 INJECTION INTRAMUSCULAR; INTRAVENOUS AS NEEDED
Status: DISCONTINUED | OUTPATIENT
Start: 2019-02-06 | End: 2019-02-06 | Stop reason: HOSPADM

## 2019-02-06 RX ORDER — FENTANYL CITRATE 50 UG/ML
25 INJECTION, SOLUTION INTRAMUSCULAR; INTRAVENOUS
Status: DISCONTINUED | OUTPATIENT
Start: 2019-02-06 | End: 2019-02-06 | Stop reason: HOSPADM

## 2019-02-06 RX ORDER — SODIUM CHLORIDE 9 MG/ML
25 INJECTION, SOLUTION INTRAVENOUS CONTINUOUS
Status: DISCONTINUED | OUTPATIENT
Start: 2019-02-06 | End: 2019-02-06 | Stop reason: HOSPADM

## 2019-02-06 RX ADMIN — PROPOFOL 25 MG: 10 INJECTION, EMULSION INTRAVENOUS at 08:23

## 2019-02-06 RX ADMIN — ONDANSETRON 4 MG: 2 INJECTION INTRAMUSCULAR; INTRAVENOUS at 08:23

## 2019-02-06 RX ADMIN — DOXYCYCLINE HYCLATE 100 MG: 100 TABLET, COATED ORAL at 08:04

## 2019-02-06 RX ADMIN — MIDAZOLAM HYDROCHLORIDE 3 MG: 1 INJECTION, SOLUTION INTRAMUSCULAR; INTRAVENOUS at 08:05

## 2019-02-06 RX ADMIN — DEXAMETHASONE SODIUM PHOSPHATE 8 MG: 4 INJECTION, SOLUTION INTRA-ARTICULAR; INTRALESIONAL; INTRAMUSCULAR; INTRAVENOUS; SOFT TISSUE at 08:23

## 2019-02-06 RX ADMIN — KETOROLAC TROMETHAMINE 30 MG: 30 INJECTION, SOLUTION INTRAMUSCULAR; INTRAVENOUS at 08:31

## 2019-02-06 RX ADMIN — PROPOFOL 50 MG: 10 INJECTION, EMULSION INTRAVENOUS at 08:14

## 2019-02-06 RX ADMIN — PROPOFOL 25 MG: 10 INJECTION, EMULSION INTRAVENOUS at 08:19

## 2019-02-06 RX ADMIN — SODIUM CHLORIDE, SODIUM LACTATE, POTASSIUM CHLORIDE, CALCIUM CHLORIDE: 600; 310; 30; 20 INJECTION, SOLUTION INTRAVENOUS at 08:05

## 2019-02-06 RX ADMIN — ACETAMINOPHEN 1000 MG: 10 INJECTION, SOLUTION INTRAVENOUS at 08:15

## 2019-02-06 RX ADMIN — PROPOFOL 25 MG: 10 INJECTION, EMULSION INTRAVENOUS at 08:15

## 2019-02-06 RX ADMIN — PROPOFOL 25 MG: 10 INJECTION, EMULSION INTRAVENOUS at 08:20

## 2019-02-06 RX ADMIN — FENTANYL CITRATE 25 MCG: 50 INJECTION, SOLUTION INTRAMUSCULAR; INTRAVENOUS at 08:14

## 2019-02-06 RX ADMIN — MIDAZOLAM HYDROCHLORIDE 2 MG: 1 INJECTION, SOLUTION INTRAMUSCULAR; INTRAVENOUS at 08:10

## 2019-02-06 RX ADMIN — LIDOCAINE HYDROCHLORIDE 60 MG: 20 INJECTION, SOLUTION EPIDURAL; INFILTRATION; INTRACAUDAL; PERINEURAL at 08:14

## 2019-02-06 NOTE — H&P
Gynecology History and Physical 
 
Name: Yehuda Rodriguez MRN: [id number] SSN: [id number] YOB: 1995  Age: 21 y.o. Sex: female Subjective: Chief complaint:  Missed  Shiva Fernando is a 21 y.o.  female with a history of missed Ab/blighted ovum diagnosed by serial ultrasounds. She is admitted for Procedure(s) (LRB): 
SUCTION DILATATION AND CURETTAGE (CHOICE ANES) (N/A). The current method of family planning is none. Blood type is B+. Past Medical History:  
Diagnosis Date  Anxiety  Asthma CHILDHOOD  Depression  Hx laparoscopic cholecystectomy 2017 Abhishek Rich Ahr  Ill-defined condition   
 pain and nausea associated with gallbladder  Thromboembolus ([facility] [address] 2018 BENIGN BLOOD CLOT NEAR LIVER Past Surgical History:  
Procedure Laterality Date  HX GI    
 CHOLECYSTECTOMY No Known Allergies Prior to Admission medications Not on File Family History Problem Relation Age of Onset  No Known Problems Father  Cancer Maternal Grandmother BREAST CA  
 Hypertension Maternal Grandmother  No Known Problems Mother  Cancer Maternal Grandfather COLON AND LUNG CA  
 Hypertension Maternal Grandfather  Anesth Problems Neg Hx OB History No data available Social History Socioeconomic History  Marital status: SINGLE Spouse name: Not on file  Number of children: Not on file  Years of education: Not on file  Highest education level: Not on file Social Needs  Financial resource strain: Not on file  Food insecurity - worry: Not on file  Food insecurity - inability: Not on file  Transportation needs - medical: Not on file  Transportation needs - non-medical: Not on file Occupational History  Not on file Tobacco Use  Smoking status: Former Smoker Packs/day: 0.50 Years: 3.00 Pack years: 1.50 Last attempt to quit: 11/8/2015 Years since quitting: 3.2  Smokeless tobacco: Never Used Substance and Sexual Activity  Alcohol use: Yes Alcohol/week: 0.0 oz  
  Comment: RARE  Drug use: No  
 Sexual activity: Yes  
  Partners: Male Birth control/protection: Condom, Injection Other Topics Concern  Not on file Social History Narrative  Not on file Review of Systems: A comprehensive review of systems was negative except for that written in the History of Present Illness. Objective:  
 
Vitals:  
 02/01/19 1111 02/06/19 0720 02/06/19 2481 BP:  106/67 Pulse:   85 Resp:  18 Temp:  97.7 °F (36.5 °C) SpO2:  100% Weight: 70.8 kg (156 lb) Height: 5' 2\" (1.575 m) General:  alert, cooperative, no distress, appears stated age Skin:  Normal.  
Eyes: negative Mouth: MMM no lesions Lymph Nodes:  Cervical, supraclavicular, and axillary nodes normal.  
Lungs:  clear to auscultation bilaterally Heart:  regular rate and rhythm Abdomen: soft, non-tender. Bowel sounds normal. No masses,  no organomegaly CVA:  absent Genitourinary: defer exam  
Extremities:  extremities normal, atraumatic, no cyanosis or edema Neurologic:  negative Psychiatric:  non focal  
 
 
 
Assessment:  
 
Missed ab/blighted ovum Plan:  
 
Procedure(s) (LRB): 
SUCTION DILATATION AND CURETTAGE (CHOICE ANES) (N/A) Discussed the risks of surgery including the risks of bleeding, infection, deep vein thrombosis, and surgical injuries to internal organs including but not limited to the bowels, bladder, rectum, and female reproductive organs. The patient understands the risks; any and all questions were answered to the patient's satisfaction. Signed By:  Milka Wood MD   
 February 6, 2019

## 2019-02-06 NOTE — OP NOTES
SUCTION CURETTAGE FULL OP NOTE    Yehuda Rodriguez  [id number]  1995      DATE OF PROCEDURE:  2/6/2019    PREOPERATIVE DIAGNOSIS:  MISSED AB    POSTOPERATIVE DIAGNOSIS:  same    PROCEDURE: Procedure(s):  SUCTION DILATATION AND CURETTAGE (CHOICE ANES)     SURGEON:  Carmen Shahid MD    ANESTHESIA:monitored anesthesia care and pericervical block    EBL: less than 100 ml    SPECIMENS: Products of conception    FINDINGS: midposition 8wks size uterus    DESCRIPTION OF PROCEDURE: The patient was placed on the operating room table in the supine position and placed under general endotracheal anesthesia. Time out was done to confirm the operating procedure, surgeon, patient and site. Once confirmed by the team, procedure was started. PROCEDURE: Patient was placed on the operating table in the supine position and placed under general endotracheal anesthesia. She was repositioned in the dorsal lithotomy position and prepped and draped in the usual fashion for vaginal surgery. Cervix was exposed with an operative vaginal speculum and grasped with a single-tooth tenaculum. 10cc of 1/2% marcaine injected as a pericervical ray. The cervix was serially dilated to 8mm. A curved 8 suction curette device was then introduced into the endometrial cavity . Thorough suction curettage followed by sharp curettage with a large curette followed again by suction curettage was performed until the suction returned no further clot or products of conception. A good uterine crie was obtained. The uterus was massaged. Hemostasis appeared normal, Instruments were removed. The patient went to the recovery room in satisfactory condition. All counts were correct times two.     Blood type: Rh+

## 2019-02-06 NOTE — ANESTHESIA POSTPROCEDURE EVALUATION
Post-Anesthesia Evaluation and Assessment Patient: Chad Mejia MRN: [id number]  SSN: [id number] YOB: 1995  Age: 21 y.o. Sex: female I have evaluated the patient and they are stable and ready for discharge from the PACU. Cardiovascular Function/Vital Signs Visit Vitals /68 Pulse 73 Temp 36.3 °C (97.4 °F) Resp 19 Ht 5' 2\" (1.575 m) Wt 70.8 kg (156 lb) SpO2 96% BMI 28.53 kg/m² Patient is status post Other anesthesia for Procedure(s): 
SUCTION DILATATION AND CURETTAGE (CHOICE ANES). Nausea/Vomiting: None Postoperative hydration reviewed and adequate. Pain: 
Pain Scale 1: Numeric (0 - 10) (02/06/19 0900) Pain Intensity 1: 0 (02/06/19 0900) Managed Neurological Status:  
Neuro (WDL): Within Defined Limits (02/06/19 0900) At baseline Mental Status, Level of Consciousness: Alert and  oriented to person, place, and time Pulmonary Status:  
O2 Device: Room air (02/06/19 0900) Adequate oxygenation and airway patent Complications related to anesthesia: None Post-anesthesia assessment completed. No concerns Signed By: Immanuel Walker MD   
 February 6, 2019 Procedure(s): 
SUCTION DILATATION AND CURETTAGE (CHOICE ANES). <BSHSIANPOST> Visit Vitals /68 Pulse 73 Temp 36.3 °C (97.4 °F) Resp 19 Ht 5' 2\" (1.575 m) Wt 70.8 kg (156 lb) SpO2 96% BMI 28.53 kg/m²

## 2019-02-06 NOTE — ANESTHESIA PREPROCEDURE EVALUATION
Anesthetic History No history of anesthetic complications Review of Systems / Medical History Patient summary reviewed, nursing notes reviewed and pertinent labs reviewed Pulmonary Within defined limits Neuro/Psych Within defined limits Cardiovascular Within defined limits Exercise tolerance: <4 METS 
  
GI/Hepatic/Renal 
Within defined limits Endo/Other Within defined limits Other Findings Comments: Missed Ab Anxiety/depression Physical Exam 
 
Airway Mallampati: I 
TM Distance: > 6 cm Neck ROM: normal range of motion Mouth opening: Normal 
 
 Cardiovascular Regular rate and rhythm,  S1 and S2 normal,  no murmur, click, rub, or gallop Dental 
No notable dental hx Pulmonary Breath sounds clear to auscultation Abdominal 
GI exam deferred Other Findings Anesthetic Plan ASA: 1 Anesthesia type: general 
 
 
 
 
Induction: Intravenous Anesthetic plan and risks discussed with: Patient

## 2019-02-06 NOTE — BRIEF OP NOTE
BRIEF OPERATIVE NOTE Date of Procedure: 2/6/2019 Preoperative Diagnosis: MISSED AB Postoperative Diagnosis: same Procedure(s): 
SUCTION DILATATION AND CURETTAGE (CHOICE ANES) Surgeon(s) and Role: * Sreedhar Jaimes MD - Primary Surgical Staff: 
Circ-1: Jhonatan Mejia Circ-2: Luis Armando Leblanc RN Scrub Tech-1: Beth Silva Event Time In Time Out Incision Start 7004 Incision Close 9412 Anesthesia: Other Estimated Blood Loss: 100ml Specimens: products of conception Findings: midposition 8wks size uterus Complications: none Implants: * No implants in log *

## 2019-02-06 NOTE — DISCHARGE INSTRUCTIONS
After Care Instructions For Your D&C      1. You may resume your usual diet once the nausea resolves. Initially, try sips of warm fluids and a bland diet. 2. Avoid heavy lifting and straining. Gradually increase your activity. First, try walking and doing light activity around the house. Resume your normal habits if no significant discomfort or bleeding develops. Most women can return to work within one to four days after this procedure. 3. You may take showers. Avoid using a tub bath, swimming pool or hot tub until after your check-up. 4. Do not place anything in your vagina until after your postoperative visit. Do not   douche, use tampons, or have intercourse because this may cause bleeding and   infection. 5. You may initially experience a heavy bloody discharge. This should not be more than your menstrual flow. Over the next several days, the flow should steadily decrease. 6. Typically following the procedure, there is little or no pain. You may feel cramps in your lower abdomen. Tylenol may relieve mild cramping. If pain medication does not improve your symptoms, you should contact your physician. 7. Contact the office if you have excessive bleeding (saturating a pad an hour for two hours or passing large clots). It is also necessary to speak with your physician if you develop chills, a temperature greater than 100.4, difficulty voiding or burning on urination. 8. Your physician may want to see you in the office after your D&C. Please call for an appointment if this has not already been arranged. Our office phone number is [phone].  If appropriate, the microscopic results from your procedure will be discussed at this follow-up visit.          ______________________________________________________________________    Anesthesia Discharge Instructions    After general anesthesia or intervenous sedation, for 24 hours or while taking prescription Narcotics:  · Limit your activities  · Do not drive or operate hazardous machinery  · If you have not urinated within 8 hours after discharge, please contact your surgeon on call. · Do not make important personal or business decisions  · Do not drink alcoholic beverages    Report the following to your surgeon:  · Excessive pain, swelling, redness or odor of or around the surgical area  · Temperature over 100.5 degrees  · Nausea and vomiting lasting longer than 4 hours or if unable to take medication  · Any signs of decreased circulation or nerve impairment to extremity:  Change in color, persistent numbness, tingling, coldness or increased pain.   · Any questions

## 2019-08-23 LAB
CHLAMYDIA, EXTERNAL: NEGATIVE
HBSAG, EXTERNAL: NEGATIVE
HIV, EXTERNAL: NON REACTIVE
N. GONORRHEA, EXTERNAL: NEGATIVE
RUBELLA, EXTERNAL: 9.5
TYPE, ABO & RH, EXTERNAL: NORMAL

## 2019-08-27 LAB — GRBS, EXTERNAL: POSITIVE

## 2019-12-21 ENCOUNTER — HOSPITAL ENCOUNTER (OUTPATIENT)
Age: 24
Setting detail: OBSERVATION
Discharge: HOME | End: 2019-12-21
Attending: OBSTETRICS & GYNECOLOGY | Admitting: OBSTETRICS & GYNECOLOGY
Payer: COMMERCIAL

## 2019-12-21 ENCOUNTER — HOSPITAL ENCOUNTER (EMERGENCY)
Age: 24
Discharge: STILL ADMITTED OR ADMINISTRATIVE | End: 2019-12-21
Attending: EMERGENCY MEDICINE

## 2019-12-21 VITALS
DIASTOLIC BLOOD PRESSURE: 82 MMHG | RESPIRATION RATE: 16 BRPM | SYSTOLIC BLOOD PRESSURE: 124 MMHG | OXYGEN SATURATION: 98 % | TEMPERATURE: 97.5 F | HEART RATE: 88 BPM

## 2019-12-21 VITALS — HEIGHT: 62 IN | WEIGHT: 175 LBS | BODY MASS INDEX: 32.2 KG/M2

## 2019-12-21 PROBLEM — Z34.90 PREGNANCY: Status: ACTIVE | Noted: 2019-12-21

## 2019-12-21 LAB
ALBUMIN SERPL-MCNC: 3.4 G/DL (ref 3.5–5)
ALBUMIN/GLOB SERPL: 0.8 {RATIO} (ref 1.1–2.2)
ALP SERPL-CCNC: 72 U/L (ref 45–117)
ALT SERPL-CCNC: 26 U/L (ref 12–78)
ANION GAP SERPL CALC-SCNC: 9 MMOL/L (ref 5–15)
APPEARANCE UR: ABNORMAL
AST SERPL-CCNC: 16 U/L (ref 15–37)
BACTERIA URNS QL MICRO: ABNORMAL /HPF
BASOPHILS # BLD: 0 K/UL (ref 0–0.1)
BASOPHILS NFR BLD: 0 % (ref 0–1)
BILIRUB SERPL-MCNC: 0.5 MG/DL (ref 0.2–1)
BILIRUB UR QL: NEGATIVE
BUN SERPL-MCNC: 13 MG/DL (ref 6–20)
BUN/CREAT SERPL: 19 (ref 12–20)
CALCIUM SERPL-MCNC: 8.8 MG/DL (ref 8.5–10.1)
CHLORIDE SERPL-SCNC: 108 MMOL/L (ref 97–108)
CO2 SERPL-SCNC: 24 MMOL/L (ref 21–32)
COLOR UR: ABNORMAL
CREAT SERPL-MCNC: 0.68 MG/DL (ref 0.55–1.02)
DIFFERENTIAL METHOD BLD: ABNORMAL
EOSINOPHIL # BLD: 0 K/UL (ref 0–0.4)
EOSINOPHIL NFR BLD: 0 % (ref 0–7)
EPITH CASTS URNS QL MICRO: ABNORMAL /LPF
ERYTHROCYTE [DISTWIDTH] IN BLOOD BY AUTOMATED COUNT: 14.6 % (ref 11.5–14.5)
GLOBULIN SER CALC-MCNC: 4.3 G/DL (ref 2–4)
GLUCOSE SERPL-MCNC: 75 MG/DL (ref 65–100)
GLUCOSE UR STRIP.AUTO-MCNC: NEGATIVE MG/DL
HCT VFR BLD AUTO: 41.5 % (ref 35–47)
HGB BLD-MCNC: 13.5 G/DL (ref 11.5–16)
HGB UR QL STRIP: NEGATIVE
IMM GRANULOCYTES # BLD AUTO: 0.2 K/UL (ref 0–0.04)
IMM GRANULOCYTES NFR BLD AUTO: 1 % (ref 0–0.5)
KETONES UR QL STRIP.AUTO: 80 MG/DL
LEUKOCYTE ESTERASE UR QL STRIP.AUTO: NEGATIVE
LYMPHOCYTES # BLD: 0.8 K/UL (ref 0.8–3.5)
LYMPHOCYTES NFR BLD: 5 % (ref 12–49)
MCH RBC QN AUTO: 29.1 PG (ref 26–34)
MCHC RBC AUTO-ENTMCNC: 32.5 G/DL (ref 30–36.5)
MCV RBC AUTO: 89.4 FL (ref 80–99)
MONOCYTES # BLD: 0.8 K/UL (ref 0–1)
MONOCYTES NFR BLD: 5 % (ref 5–13)
NEUTS SEG # BLD: 13.3 K/UL (ref 1.8–8)
NEUTS SEG NFR BLD: 89 % (ref 32–75)
NITRITE UR QL STRIP.AUTO: NEGATIVE
NRBC # BLD: 0 K/UL (ref 0–0.01)
NRBC BLD-RTO: 0 PER 100 WBC
PH UR STRIP: 6 [PH] (ref 5–8)
PLATELET # BLD AUTO: 226 K/UL (ref 150–400)
PMV BLD AUTO: 10.7 FL (ref 8.9–12.9)
POTASSIUM SERPL-SCNC: 3.9 MMOL/L (ref 3.5–5.1)
PROT SERPL-MCNC: 7.7 G/DL (ref 6.4–8.2)
PROT UR STRIP-MCNC: 30 MG/DL
RBC # BLD AUTO: 4.64 M/UL (ref 3.8–5.2)
RBC #/AREA URNS HPF: ABNORMAL /HPF (ref 0–5)
RBC MORPH BLD: ABNORMAL
SODIUM SERPL-SCNC: 141 MMOL/L (ref 136–145)
SP GR UR REFRACTOMETRY: >1.03
UA: UC IF INDICATED,UAUC: ABNORMAL
UROBILINOGEN UR QL STRIP.AUTO: 0.2 EU/DL (ref 0.2–1)
WBC # BLD AUTO: 15.1 K/UL (ref 3.6–11)
WBC URNS QL MICRO: ABNORMAL /HPF (ref 0–4)

## 2019-12-21 PROCEDURE — 81001 URINALYSIS AUTO W/SCOPE: CPT

## 2019-12-21 PROCEDURE — 96360 HYDRATION IV INFUSION INIT: CPT

## 2019-12-21 PROCEDURE — 96375 TX/PRO/DX INJ NEW DRUG ADDON: CPT

## 2019-12-21 PROCEDURE — 96374 THER/PROPH/DIAG INJ IV PUSH: CPT

## 2019-12-21 PROCEDURE — 80053 COMPREHEN METABOLIC PANEL: CPT

## 2019-12-21 PROCEDURE — 85025 COMPLETE CBC W/AUTO DIFF WBC: CPT

## 2019-12-21 PROCEDURE — 99218 HC RM OBSERVATION: CPT

## 2019-12-21 PROCEDURE — 36415 COLL VENOUS BLD VENIPUNCTURE: CPT

## 2019-12-21 PROCEDURE — 87086 URINE CULTURE/COLONY COUNT: CPT

## 2019-12-21 PROCEDURE — 87147 CULTURE TYPE IMMUNOLOGIC: CPT

## 2019-12-21 PROCEDURE — 99285 EMERGENCY DEPT VISIT HI MDM: CPT

## 2019-12-21 PROCEDURE — 74011000258 HC RX REV CODE- 258: Performed by: ADVANCED PRACTICE MIDWIFE

## 2019-12-21 PROCEDURE — 74011250636 HC RX REV CODE- 250/636: Performed by: ADVANCED PRACTICE MIDWIFE

## 2019-12-21 PROCEDURE — 96366 THER/PROPH/DIAG IV INF ADDON: CPT

## 2019-12-21 PROCEDURE — 74011250637 HC RX REV CODE- 250/637: Performed by: ADVANCED PRACTICE MIDWIFE

## 2019-12-21 RX ORDER — ONDANSETRON 2 MG/ML
8 INJECTION INTRAMUSCULAR; INTRAVENOUS
Status: DISCONTINUED | OUTPATIENT
Start: 2019-12-21 | End: 2019-12-21 | Stop reason: HOSPADM

## 2019-12-21 RX ORDER — GUAIFENESIN 100 MG/5ML
81 LIQUID (ML) ORAL DAILY
COMMUNITY
End: 2020-04-03

## 2019-12-21 RX ORDER — SODIUM CHLORIDE 9 MG/ML
1000 INJECTION, SOLUTION INTRAVENOUS ONCE
Status: DISCONTINUED | OUTPATIENT
Start: 2019-12-21 | End: 2019-12-21 | Stop reason: HOSPADM

## 2019-12-21 RX ORDER — ARIPIPRAZOLE 5 MG/1
5 TABLET ORAL DAILY
COMMUNITY
End: 2020-04-03

## 2019-12-21 RX ORDER — NITROFURANTOIN 25; 75 MG/1; MG/1
100 CAPSULE ORAL
Status: COMPLETED | OUTPATIENT
Start: 2019-12-21 | End: 2019-12-21

## 2019-12-21 RX ORDER — NITROFURANTOIN 25; 75 MG/1; MG/1
100 CAPSULE ORAL 2 TIMES DAILY
Qty: 9 CAP | Refills: 0 | Status: SHIPPED | OUTPATIENT
Start: 2019-12-21 | End: 2019-12-26

## 2019-12-21 RX ORDER — ONDANSETRON 4 MG/1
4 TABLET, ORALLY DISINTEGRATING ORAL
Qty: 20 TAB | Refills: 0 | Status: SHIPPED | OUTPATIENT
Start: 2019-12-21 | End: 2020-04-03

## 2019-12-21 RX ORDER — ONDANSETRON 2 MG/ML
4 INJECTION INTRAMUSCULAR; INTRAVENOUS
Status: COMPLETED | OUTPATIENT
Start: 2019-12-21 | End: 2019-12-21

## 2019-12-21 RX ADMIN — ONDANSETRON 4 MG: 2 INJECTION INTRAMUSCULAR; INTRAVENOUS at 21:57

## 2019-12-21 RX ADMIN — PROMETHAZINE HYDROCHLORIDE 25 MG: 25 INJECTION INTRAMUSCULAR; INTRAVENOUS at 21:59

## 2019-12-21 RX ADMIN — SODIUM CHLORIDE, POTASSIUM CHLORIDE, SODIUM LACTATE AND CALCIUM CHLORIDE 1000 ML: 600; 310; 30; 20 INJECTION, SOLUTION INTRAVENOUS at 21:25

## 2019-12-21 RX ADMIN — NITROFURANTOIN (MONOHYDRATE/MACROCRYSTALS) 100 MG: 75; 25 CAPSULE ORAL at 23:15

## 2019-12-22 NOTE — DISCHARGE INSTRUCTIONS
Patient Education        Nausea and Vomiting: Care Instructions  Your Care Instructions    When you are nauseated, you may feel weak and sweaty and notice a lot of saliva in your mouth. Nausea often leads to vomiting. Most of the time you do not need to worry about nausea and vomiting, but they can be signs of other illnesses. Two common causes of nausea and vomiting are stomach flu and food poisoning. Nausea and vomiting from viral stomach flu will usually start to improve within 24 hours. Nausea and vomiting from food poisoning may last from 12 to 48 hours. The doctor has checked you carefully, but problems can develop later. If you notice any problems or new symptoms, get medical treatment right away. Follow-up care is a key part of your treatment and safety. Be sure to make and go to all appointments, and call your doctor if you are having problems. It's also a good idea to know your test results and keep a list of the medicines you take. How can you care for yourself at home? · To prevent dehydration, drink plenty of fluids, enough so that your urine is light yellow or clear like water. Choose water and other caffeine-free clear liquids until you feel better. If you have kidney, heart, or liver disease and have to limit fluids, talk with your doctor before you increase the amount of fluids you drink. · Rest in bed until you feel better. · When you are able to eat, try clear soups, mild foods, and liquids until all symptoms are gone for 12 to 48 hours. Other good choices include dry toast, crackers, cooked cereal, and gelatin dessert, such as Jell-O.  · Water is good, but adding broth, or a sport drink can help replace lost electrolytes. · The BRAT diet is the diet of choice for you right now. Rica Chess Rica Chess Bananas, Rice, Applesauce and Toast.   When should you call for help? Call 911 anytime you think you may need emergency care.  For example, call if:    · You passed out (lost consciousness).    Call your doctor now or seek immediate medical care if:    · You have symptoms of dehydration, such as:  ? Dry eyes and a dry mouth. ? Passing only a little dark urine. ? Feeling thirstier than usual.     · You have new or worsening belly pain.     · You have a new or higher fever.     · You vomit blood or what looks like coffee grounds.    Watch closely for changes in your health, and be sure to contact your doctor if:    · You have ongoing nausea and vomiting.     · Your vomiting is getting worse.     · Your vomiting lasts longer than 2 days.     · You are not getting better as expected. Where can you learn more? Go to http://arlene-eddie.info/. Enter 25 [id number] in the search box to learn more about \"Nausea and Vomiting: Care Instructions. \"  Current as of: June 26, 2019  Content Version: 12.2  © 9438-9609 [employer]. Care instructions adapted under license by [employer] (which disclaims liability or warranty for this information). If you have questions about a medical condition or this instruction, always ask your healthcare professional. [place] [address] any warranty or liability for your use of this information. Patient Education        Urinary Tract Infection in Women: Care Instructions  Your Care Instructions    A urinary tract infection, or UTI, is a general term for an infection anywhere between the kidneys and the urethra (where urine comes out). Most UTIs are bladder infections. They often cause pain or burning when you urinate. UTIs are caused by bacteria and can be cured with antibiotics. Be sure to complete your treatment so that the infection goes away. Follow-up care is a key part of your treatment and safety. Be sure to make and go to all appointments, and call your doctor if you are having problems. It's also a good idea to know your test results and keep a list of the medicines you take. How can you care for yourself at home?   · Take your antibiotics as directed. Do not stop taking them just because you feel better. You need to take the full course of antibiotics. · Drink extra water and other fluids for the next day or two. This may help wash out the bacteria that are causing the infection. (If you have kidney, heart, or liver disease and have to limit fluids, talk with your doctor before you increase your fluid intake.)  · Avoid drinks that are carbonated or have caffeine. They can irritate the bladder. · Urinate often. Try to empty your bladder each time. · To relieve pain, take a hot bath or lay a heating pad set on low over your lower belly or genital area. Never go to sleep with a heating pad in place. To prevent UTIs  · Drink plenty of water each day. This helps you urinate often, which clears bacteria from your system. (If you have kidney, heart, or liver disease and have to limit fluids, talk with your doctor before you increase your fluid intake.)  · Urinate when you need to. · Urinate right after you have sex. · Change sanitary pads often. · Avoid douches, bubble baths, feminine hygiene sprays, and other feminine hygiene products that have deodorants. · After going to the bathroom, wipe from front to back. When should you call for help? Call your doctor now or seek immediate medical care if:    · Symptoms such as fever, chills, nausea, or vomiting get worse or appear for the first time.     · You have new pain in your back just below your rib cage. This is called flank pain.     · There is new blood or pus in your urine.     · You have any problems with your antibiotic medicine.    Watch closely for changes in your health, and be sure to contact your doctor if:    · You are not getting better after taking an antibiotic for 2 days.     · Your symptoms go away but then come back. Where can you learn more? Go to http://arlene-eddie.info/.   Enter E666 in the search box to learn more about \"Urinary Tract Infection in Women: Care Instructions. \"  Current as of: December 19, 2018  Content Version: 12.2  © 9492-0869 [employer]. Care instructions adapted under license by [employer] (which disclaims liability or warranty for this information). If you have questions about a medical condition or this instruction, always ask your healthcare professional. [address] any warranty or liability for your use of this information. Please keep all scheduled appointment with your OB.

## 2019-12-22 NOTE — PROGRESS NOTES
12/21/2019  8:54 PM Received client to LDR#12 with c/o N/V/D client stated that it all  started (vomiting and diarrhea) at about 1800 but she has been queasy all day. Rusty has been taking care of a niece that has been diagnosed with a stomach bug recently. Client denies any comlications with pregnancy but has been having some cramping today and that is what really made her seek care. Client is currently vomiting at this time. 2105-Leah HOLLY at bedside assessing and talking with client about POC. Client verbalized understanding. 2125-IV started and IV bolus initiated. Lab and urine samples obtained. 2207-Leah HOLLY at bedside SVE performed 0/0/high. Client states she is starting to feel better. 2240-Leah HOLLY at bedside talking with client about lab results and diswcharge instructions. 2315-Discharge instructions reviewed with client and client verbalized understanding. Client discharged home with family. o distress noted. IV removed.

## 2019-12-22 NOTE — ED TRIAGE NOTES
Triage: Pt arrives from home with CC of N/V/D that started 2 hours ago. Pt denies fevers but endorses chills. Pt reports she was babysitting her niece yesterday who had similar symptoms. Still making urine. Was able to ingest ice chips. Does not know how many episodes of vomiting.

## 2019-12-22 NOTE — H&P
History and Physical    Patient: Beth Bradford MRN: [id number]  SSN: [id number]    YOB: 1995  Age: 25 y.o. Sex: female      Subjective:      Beth Bradford is a 25 y.o. female  at 18w2d with an YARELY of 4/3/20. She presents to labor and delivery for nausea and vomiting that started around 1800 tonights. Reports she woke up this morning with a small stomach ache, but was able to eat biscuits and eggs for breakfast around 11 am. At 1800 this afternoon her discomfort started to get more intense and she started to vomit. She is unsure how many times she has vomited, but reports it has been several times. In last hour she has started to feel some uterine cramping. Also reports some mild diarrhea. Reports she was babysitting her niece yesterday and she had similar symptoms. Denies LOF and VB, endorses good fetal movement. Pt with a history of bipolar disorder- on latuda, psych managing. Pt with history of possible small portal vein thrombosis- on baby ASA daily. Pt also with history of HSV. Pt GBS positive.       Past Medical History:   Diagnosis Date    Anxiety     Asthma     CHILDHOOD    Depression     Hx laparoscopic cholecystectomy 2017    Lap Irma-Dr. Bone Or     Ill-defined condition     pain and nausea associated with gallbladder    Thromboembolus ([facility] [address] 2018    BENIGN BLOOD CLOT NEAR LIVER      Past Surgical History:   Procedure Laterality Date    HX GI      CHOLECYSTECTOMY      Family History   Problem Relation Age of Onset    No Known Problems Father     Cancer Maternal Grandmother         BREAST CA    Hypertension Maternal Grandmother     No Known Problems Mother     Cancer Maternal Grandfather         COLON AND LUNG CA    Hypertension Maternal Grandfather     Anesth Problems Neg Hx      Social History     Tobacco Use    Smoking status: Former Smoker     Packs/day: 0.50     Years: 3.00     Pack years: 1.50     Last attempt to quit: 2015     Years since quittin.1    Smokeless tobacco: Never Used   Substance Use Topics    Alcohol use: Yes     Alcohol/week: 0.0 standard drinks     Comment: RARE      Prior to Admission medications    Medication Sig Start Date End Date Taking? Authorizing Provider   ibuprofen (MOTRIN) 600 mg tablet Take 1 Tab by mouth every six (6) hours as needed for Pain. 19   Anna Mccabe MD   oxyCODONE-acetaminophen (PERCOCET) 5-325 mg per tablet Take 1 Tab by mouth every four (4) hours as needed for Pain. Max Daily Amount: 6 Tabs. 19   Anna Lam MD        No Known Allergies    Review of Systems:  A comprehensive review of systems was negative except for that written in the History of Present Illness. Objective: There were no vitals filed for this visit. Physical Exam:  GENERAL: alert, cooperative, no distress, appears stated age  LUNG: clear to auscultation bilaterally  HEART: regular rate and rhythm, S1, S2 normal, no murmur, click, rub or gallop  ABDOMEN: soft, non-tender. Bowel sounds normal. No masses,  no organomegaly  EXTREMITIES:  extremities normal, atraumatic, no cyanosis or edema  SKIN: Normal.  NEUROLOGIC: negative  PSYCHIATRIC: WNL    SVE: closed/thick/high    NST: Monitored for 20 minutes, reactive, cat 1, baseline: 150, positive accles, no decels, moderate variability, no ctx    Assessment:     Hospital Problems  Date Reviewed: 2019          Codes Class Noted POA    Pregnancy ICD-10-CM: Z34.90  ICD-9-CM: V22.2  2019 Unknown            Stomach virus  Dehydration  UTI    Plan:     Admit to observation  NST, IV, 1 L LR bolus, CBC, CMP, UA w/reflex culture  Cervical exam  Zofran 4 mg IV, Phenergan 25 mg IV    Patient feeling somewhat improved after hydration and medication. Reports lessening in cramping, mainly with ambulation now. Reviewed UTI and need for antibiotic, macrobid 100mg po bid x 5 days.    Rx for zofran, 4 mg po q 6 hours PRN n/v.    Reviewed s/s of PTL and when to return with concerns. Encouraged po hydration  Discharged home  Keep next routine ob appt.     Signed By: Verena Santos CNM     December 21, 2019

## 2019-12-23 LAB
BACTERIA SPEC CULT: ABNORMAL
BACTERIA SPEC CULT: ABNORMAL
CC UR VC: ABNORMAL
SERVICE CMNT-IMP: ABNORMAL

## 2020-01-13 LAB — T. PALLIDUM, EXTERNAL: NORMAL

## 2020-04-01 ENCOUNTER — HOSPITAL ENCOUNTER (INPATIENT)
Age: 25
LOS: 2 days | Discharge: HOME | End: 2020-04-03
Attending: OBSTETRICS & GYNECOLOGY | Admitting: OBSTETRICS & GYNECOLOGY
Payer: COMMERCIAL

## 2020-04-01 ENCOUNTER — ANESTHESIA EVENT (OUTPATIENT)
Dept: LABOR AND DELIVERY | Age: 25
End: 2020-04-01
Payer: COMMERCIAL

## 2020-04-01 ENCOUNTER — ANESTHESIA (OUTPATIENT)
Dept: LABOR AND DELIVERY | Age: 25
End: 2020-04-01
Payer: COMMERCIAL

## 2020-04-01 PROBLEM — R10.9 ABDOMINAL PAIN DURING PREGNANCY IN THIRD TRIMESTER: Status: ACTIVE | Noted: 2020-04-01

## 2020-04-01 PROBLEM — O47.03 PRETERM UTERINE CONTRACTIONS IN THIRD TRIMESTER, ANTEPARTUM: Status: ACTIVE | Noted: 2020-04-01

## 2020-04-01 PROBLEM — O26.893 ABDOMINAL PAIN DURING PREGNANCY IN THIRD TRIMESTER: Status: ACTIVE | Noted: 2020-04-01

## 2020-04-01 LAB
BASOPHILS # BLD: 0 K/UL (ref 0–0.1)
BASOPHILS NFR BLD: 0 % (ref 0–1)
DIFFERENTIAL METHOD BLD: ABNORMAL
EOSINOPHIL # BLD: 0.1 K/UL (ref 0–0.4)
EOSINOPHIL NFR BLD: 1 % (ref 0–7)
ERYTHROCYTE [DISTWIDTH] IN BLOOD BY AUTOMATED COUNT: 14.6 % (ref 11.5–14.5)
HCT VFR BLD AUTO: 34.9 % (ref 35–47)
HGB BLD-MCNC: 11.2 G/DL (ref 11.5–16)
IMM GRANULOCYTES # BLD AUTO: 0.1 K/UL (ref 0–0.04)
IMM GRANULOCYTES NFR BLD AUTO: 1 % (ref 0–0.5)
LYMPHOCYTES # BLD: 1.9 K/UL (ref 0.8–3.5)
LYMPHOCYTES NFR BLD: 18 % (ref 12–49)
MCH RBC QN AUTO: 27.1 PG (ref 26–34)
MCHC RBC AUTO-ENTMCNC: 32.1 G/DL (ref 30–36.5)
MCV RBC AUTO: 84.5 FL (ref 80–99)
MONOCYTES # BLD: 1.1 K/UL (ref 0–1)
MONOCYTES NFR BLD: 10 % (ref 5–13)
NEUTS SEG # BLD: 7.7 K/UL (ref 1.8–8)
NEUTS SEG NFR BLD: 70 % (ref 32–75)
NRBC # BLD: 0.02 K/UL (ref 0–0.01)
NRBC BLD-RTO: 0.2 PER 100 WBC
PLATELET # BLD AUTO: 211 K/UL (ref 150–400)
PMV BLD AUTO: 11.1 FL (ref 8.9–12.9)
RBC # BLD AUTO: 4.13 M/UL (ref 3.8–5.2)
WBC # BLD AUTO: 11 K/UL (ref 3.6–11)

## 2020-04-01 PROCEDURE — 85025 COMPLETE CBC W/AUTO DIFF WBC: CPT

## 2020-04-01 PROCEDURE — 74011250636 HC RX REV CODE- 250/636

## 2020-04-01 PROCEDURE — 74011250636 HC RX REV CODE- 250/636: Performed by: ANESTHESIOLOGY

## 2020-04-01 PROCEDURE — 75410000003 HC RECOV DEL/VAG/CSECN EA 0.5 HR

## 2020-04-01 PROCEDURE — 00HU33Z INSERTION OF INFUSION DEVICE INTO SPINAL CANAL, PERCUTANEOUS APPROACH: ICD-10-PCS | Performed by: ANESTHESIOLOGY

## 2020-04-01 PROCEDURE — A4300 CATH IMPL VASC ACCESS PORTAL: HCPCS

## 2020-04-01 PROCEDURE — 76060000078 HC EPIDURAL ANESTHESIA

## 2020-04-01 PROCEDURE — 74011000250 HC RX REV CODE- 250: Performed by: ANESTHESIOLOGY

## 2020-04-01 PROCEDURE — 99283 EMERGENCY DEPT VISIT LOW MDM: CPT

## 2020-04-01 PROCEDURE — 77030019905 HC CATH URETH INTMIT MDII -A

## 2020-04-01 PROCEDURE — 65410000002 HC RM PRIVATE OB

## 2020-04-01 PROCEDURE — 36415 COLL VENOUS BLD VENIPUNCTURE: CPT

## 2020-04-01 PROCEDURE — 74011250636 HC RX REV CODE- 250/636: Performed by: OBSTETRICS & GYNECOLOGY

## 2020-04-01 PROCEDURE — 75410000000 HC DELIVERY VAGINAL/SINGLE

## 2020-04-01 PROCEDURE — 74011250637 HC RX REV CODE- 250/637: Performed by: OBSTETRICS & GYNECOLOGY

## 2020-04-01 PROCEDURE — 75410000002 HC LABOR FEE PER 1 HR

## 2020-04-01 PROCEDURE — 0UQMXZZ REPAIR VULVA, EXTERNAL APPROACH: ICD-10-PCS | Performed by: OBSTETRICS & GYNECOLOGY

## 2020-04-01 PROCEDURE — 74011000258 HC RX REV CODE- 258: Performed by: OBSTETRICS & GYNECOLOGY

## 2020-04-01 RX ORDER — ACETAMINOPHEN 325 MG/1
650 TABLET ORAL
Status: DISCONTINUED | OUTPATIENT
Start: 2020-04-01 | End: 2020-04-03 | Stop reason: HOSPADM

## 2020-04-01 RX ORDER — NALOXONE HYDROCHLORIDE 0.4 MG/ML
0.4 INJECTION, SOLUTION INTRAMUSCULAR; INTRAVENOUS; SUBCUTANEOUS AS NEEDED
Status: DISCONTINUED | OUTPATIENT
Start: 2020-04-01 | End: 2020-04-01

## 2020-04-01 RX ORDER — ONDANSETRON 2 MG/ML
4 INJECTION INTRAMUSCULAR; INTRAVENOUS
Status: DISCONTINUED | OUTPATIENT
Start: 2020-04-01 | End: 2020-04-01

## 2020-04-01 RX ORDER — SODIUM CHLORIDE 0.9 % (FLUSH) 0.9 %
5-40 SYRINGE (ML) INJECTION EVERY 8 HOURS
Status: DISCONTINUED | OUTPATIENT
Start: 2020-04-01 | End: 2020-04-01

## 2020-04-01 RX ORDER — OXYTOCIN/0.9 % SODIUM CHLORIDE 30/500 ML
PLASTIC BAG, INJECTION (ML) INTRAVENOUS
Status: COMPLETED
Start: 2020-04-01 | End: 2020-04-01

## 2020-04-01 RX ORDER — BUPIVACAINE HYDROCHLORIDE 2.5 MG/ML
30 INJECTION, SOLUTION EPIDURAL; INFILTRATION; INTRACAUDAL ONCE
Status: DISCONTINUED | OUTPATIENT
Start: 2020-04-01 | End: 2020-04-01

## 2020-04-01 RX ORDER — SIMETHICONE 80 MG
80 TABLET,CHEWABLE ORAL
Status: DISCONTINUED | OUTPATIENT
Start: 2020-04-01 | End: 2020-04-03 | Stop reason: HOSPADM

## 2020-04-01 RX ORDER — SODIUM CHLORIDE 0.9 % (FLUSH) 0.9 %
5-40 SYRINGE (ML) INJECTION EVERY 8 HOURS
Status: DISCONTINUED | OUTPATIENT
Start: 2020-04-01 | End: 2020-04-03 | Stop reason: HOSPADM

## 2020-04-01 RX ORDER — HYDROCORTISONE ACETATE PRAMOXINE HCL 2.5; 1 G/100G; G/100G
CREAM TOPICAL AS NEEDED
Status: DISCONTINUED | OUTPATIENT
Start: 2020-04-01 | End: 2020-04-03 | Stop reason: HOSPADM

## 2020-04-01 RX ORDER — SODIUM CHLORIDE 0.9 % (FLUSH) 0.9 %
5-40 SYRINGE (ML) INJECTION AS NEEDED
Status: DISCONTINUED | OUTPATIENT
Start: 2020-04-01 | End: 2020-04-03 | Stop reason: HOSPADM

## 2020-04-01 RX ORDER — FENTANYL/BUPIVACAINE/NS/PF 2-1250MCG
10 PREFILLED PUMP RESERVOIR EPIDURAL CONTINUOUS
Status: DISCONTINUED | OUTPATIENT
Start: 2020-04-01 | End: 2020-04-01

## 2020-04-01 RX ORDER — ONDANSETRON 4 MG/1
4 TABLET, ORALLY DISINTEGRATING ORAL
Status: DISCONTINUED | OUTPATIENT
Start: 2020-04-01 | End: 2020-04-03 | Stop reason: HOSPADM

## 2020-04-01 RX ORDER — HYDROCORTISONE 1 %
CREAM (GRAM) TOPICAL AS NEEDED
Status: DISCONTINUED | OUTPATIENT
Start: 2020-04-01 | End: 2020-04-03 | Stop reason: HOSPADM

## 2020-04-01 RX ORDER — OXYTOCIN/RINGER'S LACTATE 20/1000 ML
125 PLASTIC BAG, INJECTION (ML) INTRAVENOUS AS NEEDED
Status: DISCONTINUED | OUTPATIENT
Start: 2020-04-01 | End: 2020-04-03 | Stop reason: HOSPADM

## 2020-04-01 RX ORDER — CALCIUM CARBONATE 200(500)MG
400 TABLET,CHEWABLE ORAL
Status: DISCONTINUED | OUTPATIENT
Start: 2020-04-01 | End: 2020-04-01 | Stop reason: HOSPADM

## 2020-04-01 RX ORDER — SODIUM CHLORIDE 0.9 % (FLUSH) 0.9 %
5-40 SYRINGE (ML) INJECTION AS NEEDED
Status: DISCONTINUED | OUTPATIENT
Start: 2020-04-01 | End: 2020-04-01

## 2020-04-01 RX ORDER — EPHEDRINE SULFATE/0.9% NACL/PF 50 MG/5 ML
10 SYRINGE (ML) INTRAVENOUS
Status: COMPLETED | OUTPATIENT
Start: 2020-04-01 | End: 2020-04-01

## 2020-04-01 RX ORDER — FENTANYL CITRATE 50 UG/ML
100 INJECTION, SOLUTION INTRAMUSCULAR; INTRAVENOUS ONCE
Status: DISCONTINUED | OUTPATIENT
Start: 2020-04-01 | End: 2020-04-01

## 2020-04-01 RX ORDER — IBUPROFEN 400 MG/1
800 TABLET ORAL EVERY 8 HOURS
Status: DISCONTINUED | OUTPATIENT
Start: 2020-04-01 | End: 2020-04-03 | Stop reason: HOSPADM

## 2020-04-01 RX ORDER — OXYTOCIN/RINGER'S LACTATE 20/1000 ML
999 PLASTIC BAG, INJECTION (ML) INTRAVENOUS AS NEEDED
Status: DISCONTINUED | OUTPATIENT
Start: 2020-04-01 | End: 2020-04-03 | Stop reason: HOSPADM

## 2020-04-01 RX ORDER — BUTORPHANOL TARTRATE 1 MG/ML
1 INJECTION INTRAMUSCULAR; INTRAVENOUS
Status: DISCONTINUED | OUTPATIENT
Start: 2020-04-01 | End: 2020-04-01

## 2020-04-01 RX ORDER — OXYCODONE AND ACETAMINOPHEN 5; 325 MG/1; MG/1
1 TABLET ORAL
Status: DISCONTINUED | OUTPATIENT
Start: 2020-04-01 | End: 2020-04-03 | Stop reason: HOSPADM

## 2020-04-01 RX ORDER — BUPIVACAINE HYDROCHLORIDE 2.5 MG/ML
INJECTION, SOLUTION EPIDURAL; INFILTRATION; INTRACAUDAL AS NEEDED
Status: DISCONTINUED | OUTPATIENT
Start: 2020-04-01 | End: 2020-04-01 | Stop reason: HOSPADM

## 2020-04-01 RX ORDER — VALACYCLOVIR HYDROCHLORIDE 500 MG/1
500 TABLET, FILM COATED ORAL 2 TIMES DAILY
COMMUNITY
End: 2020-04-03

## 2020-04-01 RX ORDER — AMMONIA 15 % (W/V)
1 AMPUL (EA) INHALATION AS NEEDED
Status: DISCONTINUED | OUTPATIENT
Start: 2020-04-01 | End: 2020-04-03 | Stop reason: HOSPADM

## 2020-04-01 RX ORDER — SODIUM CHLORIDE, SODIUM LACTATE, POTASSIUM CHLORIDE, CALCIUM CHLORIDE 600; 310; 30; 20 MG/100ML; MG/100ML; MG/100ML; MG/100ML
125 INJECTION, SOLUTION INTRAVENOUS CONTINUOUS
Status: DISCONTINUED | OUTPATIENT
Start: 2020-04-01 | End: 2020-04-01

## 2020-04-01 RX ORDER — FENTANYL CITRATE 50 UG/ML
INJECTION, SOLUTION INTRAMUSCULAR; INTRAVENOUS AS NEEDED
Status: DISCONTINUED | OUTPATIENT
Start: 2020-04-01 | End: 2020-04-01 | Stop reason: HOSPADM

## 2020-04-01 RX ORDER — DOCUSATE SODIUM 100 MG/1
100 CAPSULE, LIQUID FILLED ORAL
Status: DISCONTINUED | OUTPATIENT
Start: 2020-04-01 | End: 2020-04-03 | Stop reason: HOSPADM

## 2020-04-01 RX ORDER — DIPHENHYDRAMINE HCL 25 MG
25 CAPSULE ORAL
Status: DISCONTINUED | OUTPATIENT
Start: 2020-04-01 | End: 2020-04-03 | Stop reason: HOSPADM

## 2020-04-01 RX ADMIN — SODIUM CHLORIDE, SODIUM LACTATE, POTASSIUM CHLORIDE, AND CALCIUM CHLORIDE 125 ML/HR: 600; 310; 30; 20 INJECTION, SOLUTION INTRAVENOUS at 04:40

## 2020-04-01 RX ADMIN — BUPIVACAINE HYDROCHLORIDE 5 ML: 2.5 INJECTION, SOLUTION EPIDURAL; INFILTRATION; INTRACAUDAL; PERINEURAL at 11:42

## 2020-04-01 RX ADMIN — Medication 10 MG: at 11:57

## 2020-04-01 RX ADMIN — AMPICILLIN SODIUM 1 G: 1 INJECTION, POWDER, FOR SOLUTION INTRAMUSCULAR; INTRAVENOUS at 13:25

## 2020-04-01 RX ADMIN — AMPICILLIN SODIUM 2 G: 2 INJECTION, POWDER, FOR SOLUTION INTRAMUSCULAR; INTRAVENOUS at 04:46

## 2020-04-01 RX ADMIN — AMPICILLIN SODIUM 1 G: 1 INJECTION, POWDER, FOR SOLUTION INTRAMUSCULAR; INTRAVENOUS at 17:20

## 2020-04-01 RX ADMIN — FENTANYL CITRATE 100 MCG: 50 INJECTION, SOLUTION INTRAMUSCULAR; INTRAVENOUS at 11:44

## 2020-04-01 RX ADMIN — BUTORPHANOL TARTRATE 1 MG: 1 INJECTION, SOLUTION INTRAMUSCULAR; INTRAVENOUS at 07:16

## 2020-04-01 RX ADMIN — BUPIVACAINE HYDROCHLORIDE 5 ML: 2.5 INJECTION, SOLUTION EPIDURAL; INFILTRATION; INTRACAUDAL; PERINEURAL at 11:44

## 2020-04-01 RX ADMIN — OXYTOCIN 30000 MILLI-UNITS: 10 INJECTION, SOLUTION INTRAMUSCULAR; INTRAVENOUS at 18:35

## 2020-04-01 RX ADMIN — Medication 10 ML/HR: at 11:49

## 2020-04-01 RX ADMIN — AMPICILLIN SODIUM 1 G: 1 INJECTION, POWDER, FOR SOLUTION INTRAMUSCULAR; INTRAVENOUS at 09:01

## 2020-04-01 RX ADMIN — IBUPROFEN 800 MG: 400 TABLET, FILM COATED ORAL at 22:51

## 2020-04-01 RX ADMIN — BUTORPHANOL TARTRATE 1 MG: 1 INJECTION, SOLUTION INTRAMUSCULAR; INTRAVENOUS at 10:13

## 2020-04-01 RX ADMIN — SODIUM CHLORIDE, SODIUM LACTATE, POTASSIUM CHLORIDE, AND CALCIUM CHLORIDE 125 ML/HR: 600; 310; 30; 20 INJECTION, SOLUTION INTRAVENOUS at 11:53

## 2020-04-01 NOTE — PROGRESS NOTES
~0327: TRANSFER - IN REPORT:    Verbal report received from DOREEN Mclean  being received from L&D 11for routine progression of care      Report consisted of patients Situation, Background, Assessment and   Recommendations(SBAR). Information from the following report(s) SBAR was reviewed with the receiving nurse. Opportunity for questions and clarification was provided. Assessment completed upon patients arrival to unit and care assumed. ~0425: The patient is walking around in the room. Birthing ball is supplied per the patient's request.  ~0522: External monitors are removed per patient request so she can ambulate. ~0620: tracing maternal heart rate while the patient is leaning over the bed.

## 2020-04-01 NOTE — PROGRESS NOTES
Late entry from 0800. Received sign out from Dr. Shelby Gilliam @ [phone]. In to meet patient and partner. Patient is a  @ 39 5/7 wks who presented in labor. She has a h/o HSV with no lesions per admission physical.  She was last checked at 0700 and was 6/100/-2 by RN. She is comfortable with stadol. On ampicillin for pos GBS. Baseline 140, mod orin, pos accels, neg decels  TOCO ~q 6 minutes  Cx deferred    Plan recheck @ approx 1100, if no significant change will augment with pitocin for more active management of labor. Cont ampicillin.

## 2020-04-01 NOTE — PROGRESS NOTES
In to reevaluate patient. She is now comfortable with epidural.  She did receive 10 of ephedrine after the epidural placement. Fetal monitoring notable for a couple of late decelerations after epidural and pt reports having some bleeding. FHTs baseline 140, mod variability, decels as described above  TOCO q 6 minutes  Cx bloody show noted; 7-8/90/-2/BBOW    Decels improved with position change and now with some accels. Will hold on starting pit for fetal recovery and as pt has made some change. Appears to be bloody show, but will monitor closely for any evidence of abruption; pt made aware.

## 2020-04-01 NOTE — PROGRESS NOTES
4388  Bedside and Verbal shift change report given to LUZ Farias,RN (oncoming nurse) by JAVIER Parikh RN (offgoing nurse). Report included the following information SBAR, Kardex, MAR and Recent Results. Pt resting well between ucs.  at bedside. 0800  Dr. Beatrice Caal at bedside to talk with pt and viewed strip. 1000  Pt requested cervical check, 6-7/100/-2, plan for IV pain med 1015, pt walking and knee chest due to back pain, difficult to monitor continuously due to activity. 46  Dr. Luis Bran at bedside, epidural placed and dosed. 200  Dr. Beatrice Caal at bedside, viewed strip, sve 8/90/-2 pt resting on right side, comfortable. 1600  Dr. Beatrice Caal at bedside, viewed strip, sve 9/100/0 arom clear fluid  1800  St cath'd and sve 10/100/+1, Dr. Beatrice Caal notified. 0018 [facility]  1823  Dr. Beatrice Caal at bedside, and live baby boy delivered.

## 2020-04-01 NOTE — PROGRESS NOTES
Pt remains comfortable with epidural.   Bleeding appears to just be bloody show.     FHTs 145, mod variability, accels present, early appearing decels  TOCO q 6 minutes  Cx 9/100/0, AROM clear    Cont Amp for GBS and current management

## 2020-04-01 NOTE — ANESTHESIA PREPROCEDURE EVALUATION
Relevant Problems   No relevant active problems       Anesthetic History   No history of anesthetic complications            Review of Systems / Medical History  Patient summary reviewed, nursing notes reviewed and pertinent labs reviewed    Pulmonary            Asthma        Neuro/Psych         Psychiatric history     Cardiovascular  Within defined limits                     GI/Hepatic/Renal  Within defined limits              Endo/Other        Obesity     Other Findings              Physical Exam    Airway  Mallampati: II  TM Distance: > 6 cm  Neck ROM: normal range of motion   Mouth opening: Normal     Cardiovascular  Regular rate and rhythm,  S1 and S2 normal,  no murmur, click, rub, or gallop             Dental  No notable dental hx       Pulmonary  Breath sounds clear to auscultation               Abdominal  GI exam deferred       Other Findings            Anesthetic Plan    ASA: 2  Anesthesia type: epidural            Anesthetic plan and risks discussed with: Patient  used

## 2020-04-01 NOTE — ED NOTES
KAY: History & Physical    Name: Cecile Ruiz MRN: [id number]  SSN: [id number]    YOB: 1995  Age: 25 y.o. Sex: female      Subjective:     Reason for Admission:  CC: Pregnancy and Contractions    History of Present Illness: Cecile Ruiz is a 25 y.o.  female with an estimated gestational age of 38w11d with Estimated Date of Delivery: 4/3/20. Patient complains of moderate contractions for 1 days. Pregnancy has been complicated by HSV, and Bipolar D/o. Patient denies chest pain, fever, headache , nausea and vomiting, pelvic pressure, right upper quadrant pain  , shortness of breath, swelling, vaginal bleeding , vaginal leaking of fluid  and visual disturbances.     OB History        2    Para        Term                AB   1    Living   0       SAB   1    TAB        Ectopic        Molar        Multiple        Live Births   0              Past Medical History:   Diagnosis Date    Abnormal Papanicolaou smear of cervix 2016 ASCUS    Anxiety     Asthma     CHILDHOOD    Depression     bipolar depression-stopped abilify 2 nd trimester    Genital herpes 2016    on  valtrex    Gonorrhea 2017    treated    Hx laparoscopic cholecystectomy 2017    [facility]Dr[facility]     Ill-defined condition     pain and nausea associated with gallbladder    Liver disease     \"benign blood clot in liver\"- 2018- on baby ASA    Thromboembolus ([address] 2018    BENIGN BLOOD CLOT NEAR LIVER      Past Surgical History:   Procedure Laterality Date    HX GI      CHOLECYSTECTOMY    HX OTHER SURGICAL  2017    gallbladder removal     Social History     Occupational History    Not on file   Tobacco Use    Smoking status: Former Smoker     Packs/day: 0.50     Years: 3.00     Pack years: 1.50     Last attempt to quit: 2015     Years since quittin.4    Smokeless tobacco: Never Used   Substance and Sexual Activity    Alcohol use: Not Currently     Alcohol/week: 0.0 standard drinks     Comment: RARE    Drug use: No    Sexual activity: Yes     Partners: Male     Birth control/protection: None     Family History   Problem Relation Age of Onset    No Known Problems Father     Cancer Maternal Grandmother         BREAST CA    Hypertension Maternal Grandmother     No Known Problems Mother     Cancer Maternal Grandfather         COLON AND LUNG CA    Hypertension Maternal Grandfather     Anesth Problems Neg Hx        No Known Allergies  Prior to Admission medications    Medication Sig Start Date End Date Taking? Authorizing Provider   valACYclovir (Valtrex) 500 mg tablet Take 500 mg by mouth two (2) times a day. Yes Provider, Historical   aspirin 81 mg chewable tablet Take 81 mg by mouth daily. Provider, Historical   ARIPiprazole (ABILIFY) 5 mg tablet Take 5 mg by mouth daily. Indications: bipolar depression    Provider, Historical   prenatal vit calc,iron,folic (PRENATAL VITAMIN PO) Take 1 Tab by mouth daily. Provider, Historical   ondansetron (ZOFRAN ODT) 4 mg disintegrating tablet Take 1 Tab by mouth every six (6) hours as needed for Nausea.  Indications: Vomiting in Children due to Acute Gastroenteritis 19   Gwenettchristi Macias CNM        Review of Systems   Denies Headache, scotomata or RUQ pain, Denies leaking fluid or vaginal bleeding , reports fetal movements and contractions    Objective:     Vitals:    Vitals:    20 0323 20 0327 20 0408   BP:  (!) 125/95 118/78   Pulse:  (!) 114 (!) 103   Resp:   16   Temp:   97.8 °F (36.6 °C)   Weight: 86.6 kg (191 lb)     Height: 5' 2\" (1.575 m)        Temp (24hrs), Av.8 °F (36.6 °C), Min:97.8 °F (36.6 °C), Max:97.8 °F (36.6 °C)    BP  Min: 118/78  Max: 125/95     Physical Exam   A/o X 3 NAD  VSS- reviewed as above  HEENt NC/AT, NEck Suppl no JVD  ABdomen Gravid S=d NTTP except with contractions  Pelvic_ nulliparous    Cervical Exam: 5 cm dilated    80% effaced    -2 station    Presenting Part: cephalic  Cervical Position: posterior  Consistency: Soft  Uterine Activity: Regular Contractions every 5 min  Membranes: Intact  Fetal Heart Rate: Baseline: 130 per minute  Variability: moderate  Accelerations: yes  Decelerations: none  Uterine contractions: regular, every 5 minutes       Labs: No results found for this or any previous visit (from the past 24 hour(s)). Patient Active Problem List   Diagnosis Code    Pregnancy Z34.90     uterine contractions in third trimester, antepartum O47.03    Abdominal pain during pregnancy in third trimester O26.893, R10.9     Assessment and Plan: 1. Uterine contractions and cervix dilation c/w active labor- Active management of labor  2. HSV- Valtrex- no lesions and no Prodrome  3. GBS Positive- Rx Ampicillin  4.  H/o Bipolar d/o- consider restart Medications postpartum        Signed By:  Shivam Vivar MD     2020                 sabrina

## 2020-04-01 NOTE — PROGRESS NOTES
4730 Received to OB-ED with c/o Q 5 min contractions since 0200. Dr Bessie Schulte aware of arrival    0319 [facility] applied    2864 Dr Bessie Schulte at bedside    0327 Exam 5/100- monitor off- transferred ambulatory to LDR 11.  SBAR report to DEMI Weinberg & Hector

## 2020-04-01 NOTE — ANESTHESIA PROCEDURE NOTES
Epidural Block    Start time: 4/1/2020 11:33 AM  End time: 4/1/2020 11:45 AM  Performed by: Omid Watkins MD  Authorized by: Omid Watkins MD     Pre-Procedure  Indication: labor epidural    Preanesthetic Checklist: patient identified, risks and benefits discussed, anesthesia consent, site marked, patient being monitored, timeout performed and anesthesia consent    Timeout Time: 11:33        Epidural:   Patient position:  Seated  Prep region:  Lumbar  Prep: Patient draped and DuraPrep    Location:  L2-3    Needle and Epidural Catheter:   Needle Type:  Tuohy  Needle Gauge:  17 G  Injection Technique:  Loss of resistance using air  Attempts:  1  Catheter Size:  19 G  Events: no blood with aspiration, no cerebrospinal fluid with aspiration, no paresthesia and negative aspiration test    Test Dose:  Bupivacaine 0.25% and negative    Assessment:   Catheter Secured:  Tegaderm and tape  Insertion:  Uncomplicated  Patient tolerance:  Patient tolerated the procedure well with no immediate complications

## 2020-04-02 PROCEDURE — 74011250637 HC RX REV CODE- 250/637: Performed by: OBSTETRICS & GYNECOLOGY

## 2020-04-02 PROCEDURE — 65410000002 HC RM PRIVATE OB

## 2020-04-02 RX ORDER — IBUPROFEN 800 MG/1
800 TABLET ORAL
Qty: 30 TAB | Refills: 0 | Status: SHIPPED | OUTPATIENT
Start: 2020-04-02

## 2020-04-02 RX ADMIN — ACETAMINOPHEN 650 MG: 325 TABLET ORAL at 20:40

## 2020-04-02 RX ADMIN — ACETAMINOPHEN 650 MG: 325 TABLET ORAL at 04:45

## 2020-04-02 RX ADMIN — ACETAMINOPHEN 650 MG: 325 TABLET ORAL at 13:01

## 2020-04-02 RX ADMIN — IBUPROFEN 800 MG: 400 TABLET, FILM COATED ORAL at 18:19

## 2020-04-02 RX ADMIN — IBUPROFEN 800 MG: 400 TABLET, FILM COATED ORAL at 09:14

## 2020-04-02 NOTE — DISCHARGE INSTRUCTIONS
Vaginal Childbirth: What To Expect At Home    Your Recovery: Your body will slowly heal in the next few weeks. It is easy to get too tired and overwhelmed during the first weeks after your baby is born. Changes in your hormones can shift your mood without warning. You may find it hard to meet the extra demands on your energy and time. Take it easy on yourself. Follow-up care is a key part of your treatment and safety. Be sure to make and go to all appointments, and call your doctor if you are having problems. It's also a good idea to know your test results and keep a list of the medicines you take. How can you care for yourself at home? Vaginal bleeding and cramps  · After delivery, you will have a bloody discharge from the vagina. This will turn pink within a week and then white or yellow after about 10 days. It may last for 2 to 4 weeks or longer, until the uterus has healed. Use pads instead of tampons until you stop bleeding. · Do not worry if you pass some blood clots, as long as they are smaller than a golf ball. If you have a tear or stitches in your vaginal area, change the pad at least every 4 hours to prevent soreness and infection. · You may have cramps for the first few days after childbirth. These are normal and occur as the uterus shrinks to normal size. Take an over-the-counter pain medicine, such as acetaminophen (Tylenol), ibuprofen (Advil, Motrin), or naproxen (Aleve), for cramps. Read and follow all instructions on the label. Do not take aspirin, because it can cause more bleeding. Do not take acetaminophen (Tylenol) and other acetaminophen containing medications (i.e. Percocet) at the same time. Breast fullness  · Your breasts may overfill (engorge) in the first few days after delivery. To help milk flow and to relieve pain, warm your breasts in the shower or by using warm, moist towels before nursing.   · If you are not nursing, do not put warmth on your breasts or touch your breasts. Wear a tight bra or sports bra and use ice until the fullness goes away. This usually takes 2 to 3 days. · Put ice or a cold pack on your breast after nursing to reduce swelling and pain. Put a thin cloth between the ice and your skin. Activity  · Eat a balanced diet. Do not try to lose weight by cutting calories. Keep taking your prenatal vitamins, or take a multivitamin. · Get as much rest as you can. Try to take naps when your baby sleeps during the day. · Get some exercise every day. But do not do any heavy exercise until your doctor says it is okay. · Wait until you are healed (about 4 to 6 weeks) before you have sexual intercourse. Your doctor will tell you when it is okay to have sex. · Talk to your doctor about birth control. You can get pregnant even before your period returns. Also, you can get pregnant while you are breast-feeding. Mental Health  · Many women get the \"baby blues\" during the first few days after childbirth. You may lose sleep, feel irritable, and cry easily. You may feel happy one minute and sad the next. Hormone changes are one cause of these emotional changes. Also, the demands of a new baby, along with visits from relatives or other family needs, add to a mother's stress. The \"baby blues\" often peak around the fourth day. Then they ease up in less than 2 weeks. · If your moodiness or anxiety lasts for more than 2 weeks, or if you feel like life is not worth living, you may have postpartum depression. This is different for each mother. Some mothers with serious depression may worry intensely about their infant's well-being. Others may feel distant from their child. Some mothers might even feel that they might harm their baby. A mother may have signs of paranoia, wondering if someone is watching her. · With all the changes in your life, you may not know if you are depressed.  Pregnancy sometimes causes changes in how you feel that are similar to the symptoms of depression. · Symptoms of depression include:  · Feeling sad or hopeless and losing interest in daily activities. These are the most common symptoms of depression. · Sleeping too much or not enough. · Feeling tired. You may feel as if you have no energy. · Eating too much or too little. · [employer] ([employer]I) offers a Warm line; Chat with the Expert phone sessions; Information and Articles about Pregnancy and Postpartum Mood Disorders; Comprehensive List of Free Support Groups; Knowledgeable local coordinators who will offer support, information, and resources; Guide to Resources on [place]; Calendar of events in the  mood disorders community; Latest News and Research; and [facility] [address] for [employer]. Remember - You are not alone; You are not to blame; With help, you will be well. 0-671-083-PPD(4873). WWW. POSTPARTUM. NET   · Writing or talking about death, such as writing suicide notes or talking about guns, knives, or pills. Keep the numbers for these national suicide hotlines: 2-780-179-TALK (9-175.379.1408) and 5-352-KJOAWFA (4-741.197.6345). If you or someone you know talks about suicide or feeling hopeless, get help right away. Constipation and Hemorrhoids  · Drink plenty of fluids, enough so that your urine is light yellow or clear like water. If you have kidney, heart, or liver disease and have to limit fluids, talk with your doctor before you increase the amount of fluids you drink. · Eat plenty of fiber each day. Have a bran muffin or bran cereal for breakfast, and try eating a piece of fruit for a mid-afternoon snack. · For painful, itchy hemorrhoids, put ice or a cold pack on the area several times a day for 10 minutes at a time. Follow this by putting a warm compress on the area for another 10 to 20 minutes or by sitting in a shallow, warm bath. When should you call for help? Call 911 anytime you think you may need emergency care.  For example, call if:  · You are thinking of hurting yourself, your baby, or anyone else. · You passed out (lost consciousness). · You have symptoms of a blood clot in your lung (called a pulmonary embolism). These may include:    · Sudden chest pain. · Trouble breathing. · Coughing up blood. Call your doctor now or seek immediate medical care if:  · You have severe vaginal bleeding. · You are soaking through a pad each hour for 2 or more hours. · Your vaginal bleeding seems to be getting heavier or is still bright red 4 days after delivery. · You are dizzy or lightheaded, or you feel like you may faint. · You are vomiting or cannot keep fluids down. · You have a fever. · You have new or more belly pain. · You pass tissue (not just blood). · Your vaginal discharge smells bad. · Your belly feels tender or full and hard. · Your breasts are continuously painful or red. · You feel sad, anxious, or hopeless for more than a few days. · You have sudden, severe pain in your belly. · You have symptoms of a blood clot in your leg (called a deep vein thrombosis),          such as:  · Pain in your calf, back of the knee, thigh, or groin. · Redness and swelling in your leg or groin. · You have symptoms of preeclampsia, such as:  · Sudden swelling of your face, hands, or feet. · New vision problems (such as dimness or blurring). · A severe headache. · Your blood pressure is higher than it should be or rises suddenly. · You have new nausea or vomiting. Watch closely for changes in your health, and be sure to contact your doctor if you have any problems.

## 2020-04-02 NOTE — ROUTINE PROCESS
0730: Bedside shift change report given to BONNIE Lara RN (oncoming nurse) by Jose Plummer RN (offgoing nurse). Report included the following information SBAR.

## 2020-04-02 NOTE — DISCHARGE SUMMARY
Obstetrical Discharge Summary     Name: Felipe Brooks MRN: [id number]  SSN: [id number]    YOB: 1995  Age: 25 y.o. Sex: female      Admit Date: 2020    Discharge Date: 4/3/20    Admitting Physician: Susanna Fonseca MD     Attending Physician:  Kathryn Garcia,*     Admission Diagnoses:  uterine contractions in third trimester, antepartum [O47.03]  Abdominal pain during pregnancy in third trimester [O26.893, R10.9]    Discharge Diagnoses:   Information for the patient's :  Vincenza Lesch [[id number]]   Delivery of a 3.21 kg male infant via Vaginal, Spontaneous on 2020 at 6:23 PM  by Tom Castro. Apgars were 9  and 9 . Additional Diagnoses:   Hospital Problems  Date Reviewed: 2019          Codes Class Noted POA     uterine contractions in third trimester, antepartum ICD-10-CM: O47.03  ICD-9-CM: 644.03  2020 Unknown        Abdominal pain during pregnancy in third trimester ICD-10-CM: O26.893, R10.9  ICD-9-CM: 646.83, 789.00  2020 Unknown             Lab Results   Component Value Date/Time    Rubella, External 9.50 2019    GrBStrep, External positive 2019       Hospital Course: Normal hospital course following the delivery. Disposition at Discharge: Home or self care    Discharged Condition: Stable    Patient Instructions:   Current Discharge Medication List      START taking these medications    Details   ibuprofen (MOTRIN) 800 mg tablet Take 1 Tab by mouth every eight (8) hours as needed for Pain. Qty: 30 Tab, Refills: 0         CONTINUE these medications which have NOT CHANGED    Details   prenatal vit calc,iron,folic (PRENATAL VITAMIN PO) Take 1 Tab by mouth daily.          STOP taking these medications       valACYclovir (Valtrex) 500 mg tablet Comments:   Reason for Stopping:         aspirin 81 mg chewable tablet Comments:   Reason for Stopping:         ARIPiprazole (ABILIFY) 5 mg tablet Comments:   Reason for Stopping: ondansetron (ZOFRAN ODT) 4 mg disintegrating tablet Comments:   Reason for Stopping:               Reference my discharge instructions. Follow-up Appointments   Procedures    FOLLOW UP VISIT Appointment in: Two Weeks Mood check     Mood check     Standing Status:   Standing     Number of Occurrences:   1     Order Specific Question:   Appointment in     Answer:    Two Weeks        Signed By:  Karolina Heard MD     April 2, 2020

## 2020-04-02 NOTE — LACTATION NOTE
This note was copied from a baby's chart. Parents report Baby nursing well today,  deep latch obtained, mother is comfortable, baby feeding vigorously with rhythmic suck, swallow, breathe pattern, audible swallowing, and evident milk transfer, both breasts offered, baby is asleep following feeding. Infant asleep at time of visit. He just had circumcision. Mother has no risk factors for low supply. Mother took a breastfeeding class. She calls for asleep as needed.

## 2020-04-02 NOTE — L&D DELIVERY NOTE
Delivery Summary  Patient: January Tolentino             Circumcision:   Faviola Lai)  Additional Delivery Comments - Patient presented in labor and progressed to 9 cm with augmentation only with AROM then became complete; she pushed for approximately 20 minutes to deliver a VMI ([place]) in OA position, no nuchal cord; no difficulty with delivery of shoulders/body; placenta delivered within 5 minutes, intact with 3VC; labial lacerations as well as right periurethral lac repaired with 2.0 monocryl;  I&O following repair for 300 cc of urine; QBL pending      Information for the patient's :  Yudy Meadows [[id number]]       Labor Events:    Labor: No    Steroids: None   Cervical Ripening Date/Time:       Cervical Ripening Type: None   Antibiotics During Labor: Yes   Rupture Identifier:      Rupture Date/Time: 2020 4:01 PM   Rupture Type: AROM   Amniotic Fluid Volume: None    Amniotic Fluid Description: Clear    Amniotic Fluid Odor: None    Induction: AROM       Induction Date/Time:        Indications for Induction:      Augmentation: None   Augmentation Date/Time:      Indications for Augmentation:     Labor complications: None       Additional complications:        Delivery Events:  Indications For Episiotomy:     Episiotomy: None   Perineal Laceration(s):     Repaired:     Periurethral Laceration Location: right    Repaired: Yes   Labial Laceration Location: bilateral   Repaired: Yes   Sulcal Laceration Location:     Repaired:     Vaginal Laceration Location:     Repaired:     Cervical Laceration Location:     Repaired:     Repair Suture: Monocryl 2-0   Number of Repair Packets:     Estimated Blood Loss (ml):  ml   Quantitative Blood Loss (ml)                Delivery Date: 2020    Delivery Time: 6:23 PM  Delivery Type: Vaginal, Spontaneous  Sex:  Male    Gestational Age: 38w11d   Delivery Clinician:  Neptali Gates  Living Status: Living   Delivery Location: L&D room 11 APGARS  One minute Five minutes Ten minutes   Skin color: 1   1        Heart rate: 2   2        Grimace: 2   2        Muscle tone: 2   2        Breathin   2        Totals: 9   9            Presentation: Vertex    Position:   Occiput Anterior  Resuscitation Method:  Suctioning-bulb; Tactile Stimulation     Meconium Stained: Terminal      Cord Information: 3 Vessels  Complications: None  Cord around:    Delayed cord clamping?  Yes  Cord clamped date/time:2020  6:26 PM  Disposition of Cord Blood: Discard    Blood Gases Sent?: No    Placenta:  Date/Time: 2020  6:28 PM  Removal: Spontaneous      Appearance: Normal;Intact      Measurements:  Birth Weight: 3.21 kg      Birth Length: 48.9 cm      Head Circumference: 32.5 cm      Chest Circumference:       Abdominal Girth: 31.5 cm    Other Providers:   MAHENDRA BOB;MYRA VILLANUEVA;NATHAN GARAY, Obstetrician;Primary Nurse;Primary  Nurse;Nicu Nurse;Neonatologist;Anesthesiologist;Crna;Nurse Practitioner;Midwife;Nursery Nurse           Cord pH:  none    Episiotomy: None   Laceration(s):       Estimated Blood Loss (ml):     Labor Events  Method: AROM      Augmentation: None   Cervical Ripening:       None        Hospital Problems  Date Reviewed: 2019          Codes Class Noted POA     uterine contractions in third trimester, antepartum ICD-10-CM: O47.03  ICD-9-CM: 644.03  2020 Unknown        Abdominal pain during pregnancy in third trimester ICD-10-CM: O26.893, R10.9  ICD-9-CM: 646.83, 789.00  2020 Unknown              Operative Vaginal Delivery - none    Group B Strep:   Lab Results   Component Value Date/Time    GrBStrep, External positive 2019     Information for the patient's :  Lina Tavares [[id number]]   No results found for: ABORH, PCTABR, PCTDIG, BILI, ABORHEXT, ABORH    No results found for: APH, APCO2, APO2, AHCO3, ABEC, ABDC, O2ST, EPHV, PCO2V, PO2V, HCO3V, EBEV, EBDV, SITE, RSCOM

## 2020-04-02 NOTE — PROGRESS NOTES
Post-Partum Day Number 1 Progress Note    Rickman Draft     Assessment: Doing well, post partum day 1    Plan:  1. Continue routine postpartum and perineal care as well as maternal education. 2. The risks and benefits of the circumcision  procedure and anesthesia including: bleeding, infection, variability of cosmetic results were discussed at length with the mother. She gives informed consent to proceed as noted and her questions are answered. [id number]   Vaginal, Spontaneous   Patient doing well without significant complaint. Voiding without difficulty, normal lochia. Vitals:  Visit Vitals  /77 (BP 1 Location: Left arm, BP Patient Position: At rest;Sitting)   Pulse 90   Temp 97.9 °F (36.6 °C)   Resp 16   Ht 5' 2\" (1.575 m)   Wt 86.6 kg (191 lb)   SpO2 100%   Breastfeeding Unknown   BMI 34.93 kg/m²     Temp (24hrs), Av.1 °F (36.7 °C), Min:97.7 °F (36.5 °C), Max:98.6 °F (37 °C)        Exam:   Patient without distress. Abdomen soft, fundus firm, nontender                Lower extremities are symmetric without tenderness, cords or erythema. Labs:     Lab Results   Component Value Date/Time    WBC 11.0 2020 04:41 AM    WBC 15.1 (H) 2019 09:39 PM    WBC 10.4 2018 10:39 PM    WBC 7.8 10/28/2017 08:20 AM    HGB 11.2 (L) 2020 04:41 AM    HGB 13.5 2019 09:39 PM    HGB 13.8 2018 10:39 PM    HGB 13.8 10/28/2017 08:20 AM    HCT 34.9 (L) 2020 04:41 AM    HCT 41.5 2019 09:39 PM    HCT 42.3 2018 10:39 PM    HCT 40.5 10/28/2017 08:20 AM    PLATELET 852  04:41 AM    PLATELET 963  09:39 PM    PLATELET 854  10:39 PM    PLATELET 228  08:20 AM       No results found for this or any previous visit (from the past 24 hour(s)).

## 2020-04-02 NOTE — PROGRESS NOTES
1945 - Bedside and Verbal shift change report given to JAVIER Summers RN/JAVIER Birmingham RN (oncoming nurse) by Claire Sosa. Deven Burgess RN (offgoing nurse). Report included the following information SBAR, ED Summary, Intake/Output and Quality Measures. 1954 - Fundal check complete, baby on breast feeding. 2135 - TRANSFER - OUT REPORT:    Verbal report given to SABIHA Smith RN(name) on Jesus Draft  being transferred to MIU Room 343(unit) for routine progression of care       Report consisted of patients Situation, Background, Assessment and   Recommendations(SBAR). Information from the following report(s) SBAR, Procedure Summary, Intake/Output, MAR and Quality Measures was reviewed with the receiving nurse. Lines:   Peripheral IV 04/01/20 Left; Lower Arm (Active)   Site Assessment Clean, dry, & intact 4/1/2020  3:50 AM   Phlebitis Assessment 0 4/1/2020  3:50 AM   Infiltration Assessment 0 4/1/2020  3:50 AM   Dressing Status Clean, dry, & intact 4/1/2020  3:50 AM   Dressing Type Tape;Transparent 4/1/2020  3:50 AM   Hub Color/Line Status Pink 4/1/2020  3:50 AM        Opportunity for questions and clarification was provided.       Patient transported with:   Registered Nurse

## 2020-04-03 VITALS
DIASTOLIC BLOOD PRESSURE: 73 MMHG | WEIGHT: 191 LBS | HEIGHT: 62 IN | BODY MASS INDEX: 35.15 KG/M2 | RESPIRATION RATE: 16 BRPM | OXYGEN SATURATION: 100 % | HEART RATE: 97 BPM | SYSTOLIC BLOOD PRESSURE: 110 MMHG | TEMPERATURE: 98 F

## 2020-04-03 PROCEDURE — 74011250637 HC RX REV CODE- 250/637: Performed by: OBSTETRICS & GYNECOLOGY

## 2020-04-03 RX ADMIN — IBUPROFEN 800 MG: 400 TABLET, FILM COATED ORAL at 13:34

## 2020-04-03 RX ADMIN — IBUPROFEN 800 MG: 400 TABLET, FILM COATED ORAL at 03:14

## 2020-04-03 RX ADMIN — ACETAMINOPHEN 650 MG: 325 TABLET ORAL at 00:07

## 2020-04-03 NOTE — PROGRESS NOTES
Bedside and Verbal shift change report given to DORA Fernandes RN (oncoming nurse) by Bj Lara RN (offgoing nurse). Report included the following information SBAR, Kardex, Intake/Output, MAR and Recent Results.

## 2020-04-03 NOTE — ROUTINE PROCESS
0730: Bedside shift change report given to BONNIE Lara RN (oncoming nurse) by Yonatan Pederson RN (offgoing nurse). Report included the following information SBAR.  
3028: Discharge instructions reviewed with pt and all questions answered. E-Signature pad broken. Prescription given. Follow up in 2 weeks with Dr. Mark Gonzalez. Pt discharged in wheelchair.

## 2020-04-03 NOTE — PROGRESS NOTES
Post-Partum Day Number 2 Progress Note    Yoko Tuttle     Assessment: Doing well, post partum day 2    Plan:   1. Discharge home today  2. Follow up in office in 6 weeks with No att. providers found  3. Post partum activity advised, diet as tolerated; reviewed signs of PP depression, preE, mastitis, DVT, excessive bleeding for which she should call. 4. Discharge Medications: ibuprofen and medications prior to admission    Information for the patient's :  Uzma Garrett [[id number]]   Vaginal, Spontaneous   Patient doing well without significant complaint. Voiding without difficulty, normal lochia. Vitals:  Visit Vitals  /73 (BP 1 Location: Right arm, BP Patient Position: At rest;Sitting)   Pulse 97   Temp 98 °F (36.7 °C)   Resp 16   Ht 5' 2\" (1.575 m)   Wt 86.6 kg (191 lb)   SpO2 100%   Breastfeeding Unknown   BMI 34.93 kg/m²     Temp (24hrs), Av.2 °F (36.8 °C), Min:97.9 °F (36.6 °C), Max:98.5 °F (36.9 °C)      Exam:         Patient without distress. Abdomen soft, fundus firm, nontender                 Lower extremities are negative for swelling, cords or tenderness. Labs:     Lab Results   Component Value Date/Time    WBC 11.0 2020 04:41 AM    WBC 15.1 (H) 2019 09:39 PM    WBC 10.4 2018 10:39 PM    WBC 7.8 10/28/2017 08:20 AM    HGB 11.2 (L) 2020 04:41 AM    HGB 13.5 2019 09:39 PM    HGB 13.8 2018 10:39 PM    HGB 13.8 10/28/2017 08:20 AM    HCT 34.9 (L) 2020 04:41 AM    HCT 41.5 2019 09:39 PM    HCT 42.3 2018 10:39 PM    HCT 40.5 10/28/2017 08:20 AM    PLATELET 748  04:41 AM    PLATELET 947  09:39 PM    PLATELET 569  10:39 PM    PLATELET 864  08:20 AM       No results found for this or any previous visit (from the past 24 hour(s)).

## 2022-03-18 PROBLEM — O47.03 PRETERM UTERINE CONTRACTIONS IN THIRD TRIMESTER, ANTEPARTUM: Status: ACTIVE | Noted: 2020-04-01

## 2022-03-19 PROBLEM — R10.9 ABDOMINAL PAIN DURING PREGNANCY IN THIRD TRIMESTER: Status: ACTIVE | Noted: 2020-04-01

## 2022-03-19 PROBLEM — O26.893 ABDOMINAL PAIN DURING PREGNANCY IN THIRD TRIMESTER: Status: ACTIVE | Noted: 2020-04-01

## 2022-03-19 PROBLEM — Z34.90 PREGNANCY: Status: ACTIVE | Noted: 2019-12-21

## 2023-01-31 ENCOUNTER — OFFICE VISIT (OUTPATIENT)
Dept: URGENT CARE | Age: 28
End: 2023-01-31
Payer: COMMERCIAL

## 2023-01-31 VITALS
RESPIRATION RATE: 18 BRPM | SYSTOLIC BLOOD PRESSURE: 144 MMHG | TEMPERATURE: 98.8 F | DIASTOLIC BLOOD PRESSURE: 86 MMHG | HEART RATE: 81 BPM | WEIGHT: 184 LBS | BODY MASS INDEX: 33.65 KG/M2 | OXYGEN SATURATION: 99 %

## 2023-01-31 DIAGNOSIS — J02.0 STREP PHARYNGITIS: Primary | ICD-10-CM

## 2023-01-31 LAB
S PYO AG THROAT QL: POSITIVE
VALID INTERNAL CONTROL?: YES

## 2023-01-31 RX ORDER — LIDOCAINE HYDROCHLORIDE 20 MG/ML
15 SOLUTION OROPHARYNGEAL
Qty: 1 EACH | Refills: 0 | Status: SHIPPED | OUTPATIENT
Start: 2023-01-31

## 2023-01-31 RX ORDER — PREDNISONE 50 MG/1
50 TABLET ORAL DAILY
Qty: 5 TABLET | Refills: 0 | Status: SHIPPED | OUTPATIENT
Start: 2023-01-31

## 2023-01-31 RX ORDER — PENICILLIN V POTASSIUM 500 MG/1
500 TABLET, FILM COATED ORAL 4 TIMES DAILY
Qty: 40 TABLET | Refills: 0 | Status: SHIPPED | OUTPATIENT
Start: 2023-01-31 | End: 2023-02-10

## 2023-01-31 NOTE — PROGRESS NOTES
Sore Throat   The history is provided by the Patient. This is a new problem. The current episode started yesterday. The problem has been rapidly worsening. Patient reports a subjective fever - was not measured. Associated symptoms include headaches, swollen glands and trouble swallowing. She has had no exposure to strep or mono. She has tried NSAIDs for the symptoms.       Past Medical History:   Diagnosis Date    Abnormal Papanicolaou smear of cervix 2016 ASCUS    Anxiety     Asthma     CHILDHOOD    Depression     bipolar depression-stopped abilify 2 nd trimester    Genital herpes 2016    on  valtrex    Gonorrhea 2017    treated    Hx laparoscopic cholecystectomy 2017    [facility] [place]-Dr. Aden Espinoza     Ill-defined condition     pain and nausea associated with gallbladder    Liver disease     \"benign blood clot in liver\"- 2018- on baby ASA    Thromboembolus ([address] 2018    BENIGN BLOOD CLOT NEAR LIVER         Past Surgical History:   Procedure Laterality Date    HX GI      CHOLECYSTECTOMY    HX OTHER SURGICAL  2017    gallbladder removal         Family History   Problem Relation Age of Onset    No Known Problems Father     Cancer Maternal Grandmother         BREAST CA    Hypertension Maternal Grandmother     No Known Problems Mother     Cancer Maternal Grandfather         COLON AND LUNG CA    Hypertension Maternal Grandfather     Anesth Problems Neg Hx         Social History     Socioeconomic History    Marital status: SINGLE     Spouse name: Not on file    Number of children: Not on file    Years of education: Not on file    Highest education level: Not on file   Occupational History    Not on file   Tobacco Use    Smoking status: Former     Packs/day: 0.50     Years: 3.00     Pack years: 1.50     Types: Cigarettes     Quit date: 2015     Years since quittin.2    Smokeless tobacco: Never   Substance and Sexual Activity    Alcohol use: Not Currently     Alcohol/week: 0.0 standard drinks Comment: RARE    Drug use: No    Sexual activity: Yes     Partners: Male     Birth control/protection: None   Other Topics Concern     Service Not Asked    Blood Transfusions Not Asked    Caffeine Concern Not Asked    Occupational Exposure Not Asked    Hobby Hazards Not Asked    Sleep Concern Not Asked    Stress Concern Not Asked    Weight Concern Not Asked    Special Diet Not Asked    Back Care Not Asked    Exercise Not Asked    Bike Helmet Not Asked    Seat Belt Not Asked    Self-Exams Not Asked   Social History Narrative    Not on file     Social Determinants of Health     Financial Resource Strain: Not on file   Food Insecurity: Not on file   Transportation Needs: Not on file   Physical Activity: Not on file   Stress: Not on file   Social Connections: Not on file   Intimate Partner Violence: Not on file   Housing Stability: Not on file                ALLERGIES: Patient has no known allergies. Review of Systems   HENT:  Positive for sore throat and trouble swallowing. Neurological:  Positive for headaches. All other systems reviewed and are negative. Vitals:    01/31/23 1724 01/31/23 1725   BP: (!) 146/100 (!) 144/86   Pulse: 81    Resp: 18    Temp: 98.8 °F (37.1 °C)    SpO2: 99%    Weight: 184 lb (83.5 kg)        Physical Exam  Vitals and nursing note reviewed. Constitutional:       General: She is not in acute distress. Appearance: She is not ill-appearing. HENT:      Right Ear: Tympanic membrane and ear canal normal.      Left Ear: Tympanic membrane and ear canal normal.      Nose: Nose normal.      Mouth/Throat:      Pharynx: Posterior oropharyngeal erythema and uvula swelling present. No oropharyngeal exudate. Eyes:      General:         Right eye: No discharge. Left eye: No discharge. Conjunctiva/sclera: Conjunctivae normal.   Pulmonary:      Effort: Pulmonary effort is normal. No respiratory distress. Breath sounds: Normal breath sounds.  No decreased breath sounds, wheezing, rhonchi or rales. Musculoskeletal:      Cervical back: Neck supple. Lymphadenopathy:      Cervical: No cervical adenopathy. Skin:     Findings: No rash. MDM    Procedures        ICD-10-CM ICD-9-CM    1. Strep pharyngitis  J02.0 034.0 AMB POC RAPID STREP A        Medications Ordered Today   Medications    penicillin v potassium (VEETID) 500 mg tablet     Sig: Take 1 Tablet by mouth four (4) times daily for 10 days. Dispense:  40 Tablet     Refill:  0    lidocaine (XYLOCAINE) 2 % solution     Sig: Take 15 mL by mouth four (4) times daily as needed for Pain. Dispense:  1 Each     Refill:  0    predniSONE (DELTASONE) 50 mg tablet     Sig: Take 1 Tablet by mouth daily. Dispense:  5 Tablet     Refill:  0     Results for orders placed or performed in visit on 01/31/23   AMB POC RAPID STREP A   Result Value Ref Range    VALID INTERNAL CONTROL POC Yes     Group A Strep Ag Positive Negative     The patients condition was discussed with the patient and they understand. The patient is to follow up with primary care doctor. If signs and symptoms become worse the pt is to go to the ER. The patient is to take medications as prescribed.

## 2023-05-24 RX ORDER — PREDNISONE 50 MG/1
50 TABLET ORAL DAILY
COMMUNITY
Start: 2023-01-31

## 2023-05-24 RX ORDER — IBUPROFEN 800 MG/1
800 TABLET ORAL EVERY 8 HOURS PRN
COMMUNITY
Start: 2020-04-02

## 2023-05-24 RX ORDER — LIDOCAINE HYDROCHLORIDE 20 MG/ML
15 SOLUTION OROPHARYNGEAL 4 TIMES DAILY PRN
COMMUNITY
Start: 2023-01-31

## 2024-02-29 ENCOUNTER — OFFICE VISIT (OUTPATIENT)
Age: 29
End: 2024-02-29

## 2024-02-29 VITALS
SYSTOLIC BLOOD PRESSURE: 114 MMHG | OXYGEN SATURATION: 99 % | TEMPERATURE: 98.3 F | HEART RATE: 88 BPM | RESPIRATION RATE: 18 BRPM | WEIGHT: 200.8 LBS | DIASTOLIC BLOOD PRESSURE: 83 MMHG

## 2024-02-29 DIAGNOSIS — J06.9 VIRAL UPPER RESPIRATORY ILLNESS: Primary | ICD-10-CM

## 2024-02-29 DIAGNOSIS — J02.9 SORE THROAT: ICD-10-CM

## 2024-02-29 LAB
STREP PYOGENES DNA, POC: NEGATIVE
VALID INTERNAL CONTROL, POC: NORMAL

## 2024-02-29 RX ORDER — ETONOGESTREL 68 MG/1
IMPLANT SUBCUTANEOUS
COMMUNITY

## 2024-02-29 NOTE — PROGRESS NOTES
Subjective: (As above and below)     The patient/guardian gave verbal consent to treat.        Chief Complaint   Patient presents with    Pharyngitis     Sore throat and congestion x 3 days      HPI  Mini Dobbins is a 28 y.o. female who presents for evaluation of : sore throat, nasal congestion, runny nose. Symptom onset 3 days ago . Preceding illness: none.  No other identified aggravating or alleviating factors. Symptoms are constant and overall worsening. Denies: SOB, vomiting/diarrhea, rashes, fevers .  Familiy member has cold virus         Review of Systems    Review of Systems - negative except as listed above    Reviewed PmHx, RxHx, FmHx, SocHx, AllgHx and updated in chart.  Family History   Problem Relation Age of Onset    Cancer Maternal Grandfather         COLON AND LUNG CA    Hypertension Maternal Grandfather     Anesth Problems Neg Hx     No Known Problems Father     No Known Problems Mother     Hypertension Maternal Grandmother     Cancer Maternal Grandmother         BREAST CA       Past Medical History:   Diagnosis Date    Abnormal Papanicolaou smear of cervix 2016 ASCUS    Anxiety     Asthma     CHILDHOOD    Depression     bipolar depression-stopped abilify 2 nd trimester    Genital herpes 2016    on  valtrex    Gonorrhea 2017    treated    H[facility]cyst[facility] 2017    [facility] [place] [facility]     Ill-defined condition     pain and nausea associated with gallbladder    Liver disease     \"benign blood clot in liver\"- 2018- on baby ASA    Thromboembolus (HCC) 2018    BENIGN BLOOD CLOT NEAR LIVER       Social History     Socioeconomic History    Marital status: Single     Spouse name: None    Number of children: None    Years of education: None    Highest education level: None   Tobacco Use    Smoking status: Every Day     Current packs/day: 0.00     Types: Cigarettes     Last attempt to quit: 2015     Years since quittin.3     Passive exposure: Current

## 2025-01-12 ENCOUNTER — OFFICE VISIT (OUTPATIENT)
Age: 30
End: 2025-01-12

## 2025-01-12 VITALS
DIASTOLIC BLOOD PRESSURE: 110 MMHG | RESPIRATION RATE: 18 BRPM | SYSTOLIC BLOOD PRESSURE: 152 MMHG | OXYGEN SATURATION: 100 % | TEMPERATURE: 98.5 F | HEART RATE: 82 BPM

## 2025-01-12 DIAGNOSIS — L02.216 ABSCESS OF UMBILICUS: Primary | ICD-10-CM

## 2025-01-12 RX ORDER — SULFAMETHOXAZOLE AND TRIMETHOPRIM 800; 160 MG/1; MG/1
1 TABLET ORAL 2 TIMES DAILY
Qty: 14 TABLET | Refills: 0 | Status: SHIPPED | OUTPATIENT
Start: 2025-01-12 | End: 2025-01-19

## 2025-01-14 ENCOUNTER — OFFICE VISIT (OUTPATIENT)
Age: 30
End: 2025-01-14
Payer: COMMERCIAL

## 2025-01-14 VITALS
SYSTOLIC BLOOD PRESSURE: 120 MMHG | TEMPERATURE: 97.4 F | HEART RATE: 95 BPM | BODY MASS INDEX: 37.91 KG/M2 | DIASTOLIC BLOOD PRESSURE: 81 MMHG | RESPIRATION RATE: 12 BRPM | WEIGHT: 206 LBS | HEIGHT: 62 IN

## 2025-01-14 DIAGNOSIS — L72.0 EPIDERMAL CYST: Primary | ICD-10-CM

## 2025-01-14 PROCEDURE — 99203 OFFICE O/P NEW LOW 30 MIN: CPT | Performed by: SURGERY

## 2025-01-14 ASSESSMENT — PATIENT HEALTH QUESTIONNAIRE - PHQ9
SUM OF ALL RESPONSES TO PHQ9 QUESTIONS 1 & 2: 0
SUM OF ALL RESPONSES TO PHQ QUESTIONS 1-9: 0
SUM OF ALL RESPONSES TO PHQ QUESTIONS 1-9: 0
2. FEELING DOWN, DEPRESSED OR HOPELESS: NOT AT ALL
SUM OF ALL RESPONSES TO PHQ QUESTIONS 1-9: 0
1. LITTLE INTEREST OR PLEASURE IN DOING THINGS: NOT AT ALL
SUM OF ALL RESPONSES TO PHQ QUESTIONS 1-9: 0

## 2025-01-14 NOTE — PROGRESS NOTES
Identified pt with two pt identifiers (name and ). Reviewed chart in preparation for visit and have obtained necessary documentation.    Carie Mayers is a 29 y.o. female  Chief Complaint   Patient presents with    New Patient     Seen at request of SAHRA Mariee, eval abscess of umbilicus.      /81 (Site: Right Upper Arm, Position: Sitting, Cuff Size: Large Adult)   Pulse 95   Temp 97.4 °F (36.3 °C) (Temporal)   Resp 12   Ht 1.575 m (5' 2\")   Wt 93.4 kg (206 lb)   LMP  (LMP Unknown)   BMI 37.68 kg/m²     1. Have you been to the ER, urgent care clinic since your last visit?  Hospitalized since your last visit?[facility] urgent care    2. Have you seen or consulted any other health care providers outside of the [facility] since your last visit?  Include any pap smears or colon screening. no

## 2025-01-14 NOTE — PROGRESS NOTES
Subjective:       Carie Mayers is a 29 y.o. female who presents for evaluation of lump on her belly button. IT started last Tuesday.  It started small and grew bigger in size. On Saturday night, she went on to [employer]. Its her old naval piercing. She went to urgent care. She was prescribed antibiotics. The swelling has gone down. She feels lumps inside of it. She hasn't used her piercing in 5 years.       Past Medical History:   Diagnosis Date    ADHD (attention deficit hyperactivity disorder)     Anxiety     Bipolar disorder (HCC)     Depression     Hyperlipidemia      Past Surgical History:   Procedure Laterality Date    CHOLECYSTECTOMY  2017     Social History     Socioeconomic History    Marital status: Single     Spouse name: None    Number of children: None    Years of education: None    Highest education level: None   Tobacco Use    Smoking status: Former     Types: Cigarettes    Smokeless tobacco: Never   Vaping Use    Vaping status: Every Day   Substance and Sexual Activity    Alcohol use: Yes    Drug use: Not Currently     Types: Marijuana (Weed)    Sexual activity: Not Currently     Current Outpatient Medications   Medication Sig Dispense Refill    sulfamethoxazole-trimethoprim (BACTRIM DS;SEPTRA DS) 800-160 MG per tablet Take 1 tablet by mouth 2 times daily for 7 days 14 tablet 0     No current facility-administered medications for this visit.     No Known Allergies    Review of Systems  Pertinent items are noted in HPI.      Objective:      /81 (Site: Right Upper Arm, Position: Sitting, Cuff Size: Large Adult)   Pulse 95   Temp 97.4 °F (36.3 °C) (Temporal)   Resp 12   Ht 1.575 m (5' 2\")   Wt 93.4 kg (206 lb)   LMP  (LMP Unknown)   BMI 37.68 kg/m²     Physical Exam:  General:  Alert, cooperative, no distress, appears stated age.   Eyes:  Conjunctivae/corneas clear.    Ears:  Normal external ear canals both ears.   Nose: Nares normal. Septum midline.    Mouth/Throat: Lips, mucosa, and

## 2025-01-16 ENCOUNTER — PROCEDURE VISIT (OUTPATIENT)
Age: 30
End: 2025-01-16

## 2025-01-16 ENCOUNTER — HOSPITAL ENCOUNTER (OUTPATIENT)
Facility: HOSPITAL | Age: 30
Setting detail: SPECIMEN
Discharge: HOME | End: 2025-01-19

## 2025-01-16 VITALS
WEIGHT: 206 LBS | TEMPERATURE: 97.9 F | BODY MASS INDEX: 37.91 KG/M2 | OXYGEN SATURATION: 93 % | SYSTOLIC BLOOD PRESSURE: 123 MMHG | DIASTOLIC BLOOD PRESSURE: 84 MMHG | HEIGHT: 62 IN | HEART RATE: 96 BPM

## 2025-01-16 DIAGNOSIS — L72.0 EPIDERMAL CYST: Primary | ICD-10-CM

## 2025-01-16 RX ORDER — LIDOCAINE HYDROCHLORIDE AND EPINEPHRINE BITARTRATE 20; .01 MG/ML; MG/ML
10 INJECTION, SOLUTION SUBCUTANEOUS ONCE
Status: COMPLETED | OUTPATIENT
Start: 2025-01-16 | End: 2025-01-16

## 2025-01-16 RX ADMIN — LIDOCAINE HYDROCHLORIDE AND EPINEPHRINE BITARTRATE 10 ML: 20; .01 INJECTION, SOLUTION SUBCUTANEOUS at 14:36

## 2025-01-16 ASSESSMENT — PATIENT HEALTH QUESTIONNAIRE - PHQ9
2. FEELING DOWN, DEPRESSED OR HOPELESS: NOT AT ALL
SUM OF ALL RESPONSES TO PHQ QUESTIONS 1-9: 0
SUM OF ALL RESPONSES TO PHQ QUESTIONS 1-9: 0
1. LITTLE INTEREST OR PLEASURE IN DOING THINGS: NOT AT ALL
SUM OF ALL RESPONSES TO PHQ QUESTIONS 1-9: 0
SUM OF ALL RESPONSES TO PHQ9 QUESTIONS 1 & 2: 0
SUM OF ALL RESPONSES TO PHQ QUESTIONS 1-9: 0

## 2025-01-16 NOTE — PATIENT INSTRUCTIONS
Dr. Clarke Discharge Instructions for:  Carie Mayers    MRN: [id number] : 1995    Admitted: [unfilled]   Discharged: 2025       What to do at Home    Recommended diet: Resume your normal diet    Recommended activity: as tolerated    Follow-up with Dr. Clarke in 1-2 weeks.  Call [phone] for an appointment.    Wound Care:  Keep wound clean and dry      How to Care for Your Wound After It’s Treated With DERMABOND* Topical Skin Adhesive    DERMABOND* Topical Skin Adhesive (2-octyl cyanoacrylate) is a sterile, liquid skin adhesivethat holds wound edges together. The film will usually remain in place for 5 to 10 days, thennaturally fall off your skin.  The following will answer some of your questions and provide instructions for proper care for yourwound while it is healing:    CHECK WOUND APPEARANCE   Some swelling, redness, and pain are common with all wounds and normally will go away as the wound heals. If swelling, redness, or pain increases or if the wound feels warm to the touch, contact a doctor. Also contact a doctor if the wound edges reopen or separate.    REPLACE BANDAGES   If your wound is bandaged, keep the bandage dry.   Replace the dressing daily until the adhesive film has fallen off or if the bandage should become wet, unless otherwise instructed by your physician.   When changing the dressing, do not place tape directly over the DERMABOND adhesive film, because removing the tape later may also remove the film.    AVOID TOPICAL MEDICATIONS   Do not apply liquid or ointment medications or any other product to your wound while the DERMABOND adhesive film is in place. These may loosen the film before your wound is healed.    KEEP WOUND DRY AND PROTECTED   You may occasionally and briefly wet your wound in the shower or bath. Do not soak or scrub your wound, do not swim, and avoid periods of heavy perspiration until the DERMABOND adhesive has naturally fallen off. After showering or

## 2025-01-16 NOTE — PROGRESS NOTES
PROCEDURE NOTE      Preperative Diagnosis: abdominal wall cyst  Post-operative Diagnosis: abdominal wall cyst    Procedure: Excision of abdominal wall cyst and intermediate wound closure    Surgeon: Eugenia Clarke MD  Anesthesia: Local  Estimated Blood Loss: Minimal   Complications: None; patient tolerated the procedure well.    Carie Mayers is a 29 y.o. female who has been brought to the procedure room for excision of a 3 cm sebaceous cyst located on the abdominal wall superior to the umbilicus.    The risks, benefits, and alternatives were explained and consent was obtained for the procedure. The area was sterile prepped and draped in the usual manner.  1% lidocaine with epinephrine was infiltrated into the skin and soft tissue surrounding the lesion.  An incision was made. The cyst was circumferentially dissected and excised. It was sent to pathology. It measured 3 cm.     Hemostasis was noted.  The wound was closed with interrupted 3-0 Vicryl, 4-0 Monocryl; followed by  Dermabond.  Wound care instructions were given.  She tolerated the procedure without difficulty.

## 2025-01-16 NOTE — PROGRESS NOTES
Identified pt with two pt identifiers (name and ). Reviewed chart in preparation for visit and have obtained necessary documentation.    Carie Mayers is a 29 y.o. female  Chief Complaint   Patient presents with    Procedure     Excision of umbilical cyst      /84 (Site: Left Upper Arm, Position: Sitting, Cuff Size: Large Adult)   Pulse 96   Temp 97.9 °F (36.6 °C) (Temporal)   Ht 1.575 m (5' 2\")   Wt 93.4 kg (206 lb)   LMP  (LMP Unknown)   SpO2 93%   BMI 37.68 kg/m²     1. Have you been to the ER, urgent care clinic since your last visit?  Hospitalized since your last visit?no    2. Have you seen or consulted any other health care providers outside of the [facility] since your last visit?  Include any pap smears or colon screening. no     [unfilled]  OFFICE PROCEDURE PROGRESS NOTE        Chart reviewed for the following:   SELIN FUNES LPN, have reviewed the History, Physical and updated the Allergic reactions for Carie Mayers, 1995     TIME OUT performed immediately prior to start of procedure:   SELIN FUNES LPN, have performed the following reviews on Carie Mayers prior to the start of the procedure:            * Patient was identified by name and date of birth   * Agreement on procedure being performed was verified  * Risks and Benefits explained to the patient  * Procedure site verified and marked as necessary  * Patient was positioned for comfort  * Consent was signed and verified     Time: 1400      Date of procedure: 2025    Procedure performed by:  Eugenia Clarke MD    Provider assisted by: selin tuppince LPN    Patient assisted by: self    How tolerated by patient: tolerated the procedure well with no complications    Post Procedural Pain Scale: 0 - No Hurt    Comments:  post procedure instructions via Dr. Clarke

## 2025-01-24 ENCOUNTER — HOSPITAL ENCOUNTER (OUTPATIENT)
Facility: HOSPITAL | Age: 30
Discharge: HOME | End: 2025-01-27
Attending: OTOLARYNGOLOGY
Payer: COMMERCIAL

## 2025-01-24 DIAGNOSIS — H93.A3 PULSATILE TINNITUS, BILATERAL: ICD-10-CM

## 2025-01-24 PROCEDURE — 70480 CT ORBIT/EAR/FOSSA W/O DYE: CPT

## 2025-02-04 ENCOUNTER — TRANSCRIBE ORDERS (OUTPATIENT)
Facility: HOSPITAL | Age: 30
End: 2025-02-04

## 2025-02-04 DIAGNOSIS — G93.2 BENIGN INTRACRANIAL HYPERTENSION: Primary | ICD-10-CM

## 2025-02-19 ENCOUNTER — HOSPITAL ENCOUNTER (OUTPATIENT)
Facility: HOSPITAL | Age: 30
Discharge: HOME | End: 2025-02-22
Attending: OTOLARYNGOLOGY
Payer: COMMERCIAL

## 2025-02-19 DIAGNOSIS — G93.2 BENIGN INTRACRANIAL HYPERTENSION: ICD-10-CM

## 2025-02-19 PROCEDURE — 70553 MRI BRAIN STEM W/O & W/DYE: CPT

## 2025-02-19 PROCEDURE — A9579 GAD-BASE MR CONTRAST NOS,1ML: HCPCS | Performed by: OTOLARYNGOLOGY

## 2025-02-19 PROCEDURE — 6360000004 HC RX CONTRAST MEDICATION: Performed by: OTOLARYNGOLOGY

## 2025-02-19 RX ADMIN — GADOTERIDOL 20 ML: 279.3 INJECTION, SOLUTION INTRAVENOUS at 07:21

## 2025-03-16 ENCOUNTER — TRANSCRIBE ORDERS (OUTPATIENT)
Facility: HOSPITAL | Age: 30
End: 2025-03-16

## 2025-03-16 DIAGNOSIS — H93.A3 PULSATILE TINNITUS, BILATERAL: Primary | ICD-10-CM

## 2025-03-16 DIAGNOSIS — H04.123 DRY EYE SYNDROME OF BILATERAL LACRIMAL GLANDS: ICD-10-CM

## 2025-03-19 ENCOUNTER — TELEPHONE (OUTPATIENT)
Age: 30
End: 2025-03-19

## 2025-03-19 NOTE — TELEPHONE ENCOUNTER
[facility] received a referral from Dr. Maldonado's office for Pulsatile Tinnitus.  I spoke to the patient.   I asked her to have Dr. Muniz's notes faxed to us.   Patient's CTA is scheduled for 3/21/2025.   Will schedule patient after both are completed.

## 2025-03-21 ENCOUNTER — HOSPITAL ENCOUNTER (OUTPATIENT)
Age: 30
Discharge: HOME | End: 2025-03-24
Payer: COMMERCIAL

## 2025-03-21 DIAGNOSIS — H93.A3 PULSATILE TINNITUS, BILATERAL: ICD-10-CM

## 2025-03-21 DIAGNOSIS — H04.123 DRY EYE SYNDROME OF BILATERAL LACRIMAL GLANDS: ICD-10-CM

## 2025-03-21 PROCEDURE — 70496 CT ANGIOGRAPHY HEAD: CPT

## 2025-03-21 PROCEDURE — 6360000004 HC RX CONTRAST MEDICATION: Performed by: RADIOLOGY

## 2025-03-21 RX ORDER — IOPAMIDOL 755 MG/ML
100 INJECTION, SOLUTION INTRAVASCULAR
Status: COMPLETED | OUTPATIENT
Start: 2025-03-21 | End: 2025-03-21

## 2025-03-21 RX ADMIN — IOPAMIDOL 100 ML: 755 INJECTION, SOLUTION INTRAVENOUS at 08:58

## 2025-04-23 ENCOUNTER — OFFICE VISIT (OUTPATIENT)
Age: 30
End: 2025-04-23

## 2025-04-23 VITALS
RESPIRATION RATE: 16 BRPM | SYSTOLIC BLOOD PRESSURE: 110 MMHG | BODY MASS INDEX: 36.99 KG/M2 | DIASTOLIC BLOOD PRESSURE: 78 MMHG | WEIGHT: 201 LBS | HEIGHT: 62 IN | HEART RATE: 72 BPM | TEMPERATURE: 98 F

## 2025-04-23 DIAGNOSIS — H93.A3 PULSATILE TINNITUS OF BOTH EARS: Primary | ICD-10-CM

## 2025-04-23 NOTE — PROGRESS NOTES
Neurointerventional Surgery Clinic Note        Patient: Mini Dobbins MRN: [id number]  SSN: [id number]    YOB: 1995  Age: 29 y.o.  Sex: female      Subjective:      Mini Dobbins is a 29 y.o. female who is being seen for intermittent bilateral pulsatile tinnitus with findings of intracranial hypertension on patient's MRI brain.    Past Medical History:   Diagnosis Date    Abnormal Papanicolaou smear of cervix 2016 2018 ASCUS    ADHD (attention deficit hyperactivity disorder)     Anxiety     Asthma     CHILDHOOD    Bipolar disorder     Depression     bipolar depression-stopped abilify 2 nd trimester    Depression     Genital herpes 2016    on  valtrex    Gonorrhea 2017    treated    Head injury     Headache     [facility]     Hyperlipidemia     Ill-defined condition     pain and nausea associated with gallbladder    Liver disease     \"benign blood clot in liver\"- 2018- on baby ASA    Thromboembolus (HCC) 2018    BENIGN BLOOD CLOT NEAR LIVER      Past Surgical History:   Procedure Laterality Date    CHOLECYSTECTOMY  2017    GI      CHOLECYSTECTOMY    OTHER SURGICAL HISTORY  11/2017    gallbladder removal      Family History   Problem Relation Age of Onset    Depression Mother     Diabetes Mother     High Blood Pressure Mother     High Cholesterol Mother     High Blood Pressure Father     Cancer Maternal Grandfather         COLON AND LUNG CA    Diabetes Maternal Grandfather     Gout Maternal Grandfather     High Blood Pressure Maternal Grandfather     High Cholesterol Maternal Grandfather     Prostate Cancer Maternal Grandfather     Arthritis Maternal Grandmother     Breast Cancer Maternal Grandmother     Diabetes Maternal Grandmother     High Blood Pressure Maternal Grandmother     High Cholesterol Maternal Grandmother     Diabetes Maternal Aunt     High Blood Pressure Maternal Aunt     Hypertension Maternal Grandfather     Anesth Problems

## 2025-04-23 NOTE — PROGRESS NOTES
New patient referred by Dr Muniz presenting with Pulsatile tinnitus bilaterally.  Patient reports hearing a whooshing and pulsing sound in both ears since August 2024.  Symptoms have become worse the past month.  Reports frequent headaches.  Denies neck or ear pain.  Will see Ophthalmology soon.  No new problems reported.

## 2025-04-29 DIAGNOSIS — H93.A3 PULSATILE TINNITUS OF BOTH EARS: Primary | ICD-10-CM

## 2025-06-02 ENCOUNTER — HOSPITAL ENCOUNTER (OUTPATIENT)
Facility: HOSPITAL | Age: 30
Discharge: HOME | End: 2025-06-05
Attending: RADIOLOGY
Payer: COMMERCIAL

## 2025-06-02 VITALS
DIASTOLIC BLOOD PRESSURE: 78 MMHG | RESPIRATION RATE: 18 BRPM | OXYGEN SATURATION: 99 % | TEMPERATURE: 98.6 F | SYSTOLIC BLOOD PRESSURE: 131 MMHG | HEART RATE: 67 BPM

## 2025-06-02 DIAGNOSIS — H93.A3 PULSATILE TINNITUS OF BOTH EARS: ICD-10-CM

## 2025-06-02 LAB
COMMENT:: NORMAL
SPECIMEN HOLD: NORMAL

## 2025-06-02 PROCEDURE — 62328 DX LMBR SPI PNXR W/FLUOR/CT: CPT

## 2025-06-02 PROCEDURE — 6370000000 HC RX 637 (ALT 250 FOR IP): Performed by: STUDENT IN AN ORGANIZED HEALTH CARE EDUCATION/TRAINING PROGRAM

## 2025-06-02 RX ORDER — ACETAMINOPHEN 325 MG/1
650 TABLET ORAL ONCE
Status: COMPLETED | OUTPATIENT
Start: 2025-06-02 | End: 2025-06-02

## 2025-06-02 RX ADMIN — ACETAMINOPHEN 650 MG: 325 TABLET ORAL at 10:30

## 2025-06-02 ASSESSMENT — PAIN SCALES - GENERAL: PAINLEVEL_OUTOF10: 4

## 2025-06-02 ASSESSMENT — PAIN DESCRIPTION - DESCRIPTORS: DESCRIPTORS: THROBBING

## 2025-06-02 ASSESSMENT — PAIN DESCRIPTION - LOCATION: LOCATION: HEAD

## 2025-06-02 ASSESSMENT — PAIN - FUNCTIONAL ASSESSMENT: PAIN_FUNCTIONAL_ASSESSMENT: NONE - DENIES PAIN

## 2025-06-02 NOTE — DISCHARGE INSTRUCTIONS
[facility]  Department of Interventional Radiology  [address], [place] [id number] [phone]    LUMBAR PUNCTURE DISCHARGE EDUCATION    Radiologist:   Jey Kelley NP      Date:   June 2, 2025        Information:   A lumbar puncture (also called a spinal tap) is a procedure to collect and look at the fluid (cerebrospinal fluid, or CSF) surrounding the brain and spinal cord. During a lumbar puncture,   a needle is carefully inserted into the spinal canal low in the back (lumbar area). Samples of CSF are collected. The samples are sent to the lab for evaluation. The pressure of the   CSF also is measured during the procedure.     What should I expect after the Lumbar Puncture?    You should lie flat either on your back, stomach, or side for 1 hours post procedure to decrease the risk of developing a post Lumbar Puncture headache. The longer you remain flat   following the procedure the better in prevention of developing a headache.    You may develop a headache during the first few hours after your procedure that may last for several days. The headache may be mild to severe and may get worse when you sit or   stand.     The following may help ease a post-lumbar puncture headache:    Drink more liquid than usual after your lumbar puncture due to the removal of fluid during the procedure. Caffeine may be used to treat a headache. Drinks such as coffee,   tea, or some soft drinks have caffeine. Caffeine is also available over the counter in tablet form. Ask about using caffeine to treat your headache. Do not drink alcohol. Alcohol   can make your headache worse.   To relieve pain, take Tylenol (acetaminophen) or the pain medicine your doctor prescribed. Avoid ibuprofen (Advil, Motrin) and aspirin as they may cause you to bleed.   Lie down or rest to ease your headache pain.     Bathing & Wound Care:    You may shower after 24 hours; do not tub bath or submerge in water

## 2025-06-02 NOTE — PROGRESS NOTES
1042    Pt d/c home with family. Pt tolerating PO intake and procedure site is [facility]. I reviewed all d/c instructions. Pt stated understanding. Removed all IV/tele. Pt left with all personal belongings.

## 2025-06-04 ENCOUNTER — APPOINTMENT (OUTPATIENT)
Facility: HOSPITAL | Age: 30
End: 2025-06-04
Payer: COMMERCIAL

## 2025-06-04 ENCOUNTER — HOSPITAL ENCOUNTER (EMERGENCY)
Facility: HOSPITAL | Age: 30
Discharge: HOME | End: 2025-06-04
Attending: STUDENT IN AN ORGANIZED HEALTH CARE EDUCATION/TRAINING PROGRAM
Payer: COMMERCIAL

## 2025-06-04 ENCOUNTER — TELEPHONE (OUTPATIENT)
Age: 30
End: 2025-06-04

## 2025-06-04 VITALS
HEART RATE: 88 BPM | WEIGHT: 209.88 LBS | DIASTOLIC BLOOD PRESSURE: 88 MMHG | HEIGHT: 62 IN | TEMPERATURE: 97.8 F | BODY MASS INDEX: 38.62 KG/M2 | RESPIRATION RATE: 18 BRPM | SYSTOLIC BLOOD PRESSURE: 128 MMHG | OXYGEN SATURATION: 100 %

## 2025-06-04 DIAGNOSIS — G97.1 SPINAL HEADACHE: Primary | ICD-10-CM

## 2025-06-04 LAB
ALBUMIN SERPL-MCNC: 3.8 G/DL (ref 3.5–5)
ALBUMIN/GLOB SERPL: 0.9 (ref 1.1–2.2)
ALP SERPL-CCNC: 56 U/L (ref 45–117)
ALT SERPL-CCNC: 25 U/L (ref 12–78)
ANION GAP SERPL CALC-SCNC: 3 MMOL/L (ref 2–12)
AST SERPL-CCNC: 4 U/L (ref 15–37)
BASOPHILS # BLD: 0.08 K/UL (ref 0–0.1)
BASOPHILS NFR BLD: 1 % (ref 0–1)
BILIRUB SERPL-MCNC: 0.6 MG/DL (ref 0.2–1)
BUN SERPL-MCNC: 6 MG/DL (ref 6–20)
BUN/CREAT SERPL: 7 (ref 12–20)
CALCIUM SERPL-MCNC: 9.5 MG/DL (ref 8.5–10.1)
CHLORIDE SERPL-SCNC: 109 MMOL/L (ref 97–108)
CO2 SERPL-SCNC: 26 MMOL/L (ref 21–32)
COMMENT:: NORMAL
COMMENT:: NORMAL
CREAT SERPL-MCNC: 0.88 MG/DL (ref 0.55–1.02)
DIFFERENTIAL METHOD BLD: ABNORMAL
EOSINOPHIL # BLD: 0.14 K/UL (ref 0–0.4)
EOSINOPHIL NFR BLD: 1.8 % (ref 0–7)
ERYTHROCYTE [DISTWIDTH] IN BLOOD BY AUTOMATED COUNT: 14.4 % (ref 11.5–14.5)
GLOBULIN SER CALC-MCNC: 4.4 G/DL (ref 2–4)
GLUCOSE SERPL-MCNC: 94 MG/DL (ref 65–100)
HCG UR QL: NEGATIVE
HCT VFR BLD AUTO: 50 % (ref 35–47)
HGB BLD-MCNC: 16 G/DL (ref 11.5–16)
IMM GRANULOCYTES # BLD AUTO: 0.02 K/UL (ref 0–0.04)
IMM GRANULOCYTES NFR BLD AUTO: 0.3 % (ref 0–0.5)
LYMPHOCYTES # BLD: 3.6 K/UL (ref 0.8–3.5)
LYMPHOCYTES NFR BLD: 47 % (ref 12–49)
MCH RBC QN AUTO: 28.3 PG (ref 26–34)
MCHC RBC AUTO-ENTMCNC: 32 G/DL (ref 30–36.5)
MCV RBC AUTO: 88.5 FL (ref 80–99)
MONOCYTES # BLD: 0.39 K/UL (ref 0–1)
MONOCYTES NFR BLD: 5.1 % (ref 5–13)
NEUTS SEG # BLD: 3.43 K/UL (ref 1.8–8)
NEUTS SEG NFR BLD: 44.8 % (ref 32–75)
NRBC # BLD: 0 K/UL (ref 0–0.01)
NRBC BLD-RTO: 0 PER 100 WBC
PLATELET # BLD AUTO: 255 K/UL (ref 150–400)
PMV BLD AUTO: 10.5 FL (ref 8.9–12.9)
POTASSIUM SERPL-SCNC: 3.7 MMOL/L (ref 3.5–5.1)
PROT SERPL-MCNC: 8.2 G/DL (ref 6.4–8.2)
RBC # BLD AUTO: 5.65 M/UL (ref 3.8–5.2)
SODIUM SERPL-SCNC: 138 MMOL/L (ref 136–145)
SPECIMEN HOLD: NORMAL
SPECIMEN HOLD: NORMAL
WBC # BLD AUTO: 7.7 K/UL (ref 3.6–11)

## 2025-06-04 PROCEDURE — A9579 GAD-BASE MR CONTRAST NOS,1ML: HCPCS | Performed by: STUDENT IN AN ORGANIZED HEALTH CARE EDUCATION/TRAINING PROGRAM

## 2025-06-04 PROCEDURE — 99285 EMERGENCY DEPT VISIT HI MDM: CPT

## 2025-06-04 PROCEDURE — 80053 COMPREHEN METABOLIC PANEL: CPT

## 2025-06-04 PROCEDURE — 96374 THER/PROPH/DIAG INJ IV PUSH: CPT

## 2025-06-04 PROCEDURE — 85025 COMPLETE CBC W/AUTO DIFF WBC: CPT

## 2025-06-04 PROCEDURE — 6360000004 HC RX CONTRAST MEDICATION: Performed by: STUDENT IN AN ORGANIZED HEALTH CARE EDUCATION/TRAINING PROGRAM

## 2025-06-04 PROCEDURE — 81025 URINE PREGNANCY TEST: CPT

## 2025-06-04 PROCEDURE — 70450 CT HEAD/BRAIN W/O DYE: CPT

## 2025-06-04 PROCEDURE — 72158 MRI LUMBAR SPINE W/O & W/DYE: CPT

## 2025-06-04 RX ADMIN — GADOTERIDOL 20 ML: 279.3 INJECTION, SOLUTION INTRAVENOUS at 13:50

## 2025-06-04 ASSESSMENT — ENCOUNTER SYMPTOMS
SHORTNESS OF BREATH: 0
ABDOMINAL PAIN: 0
NAUSEA: 0
VOMITING: 0
DIARRHEA: 0
SORE THROAT: 0
COUGH: 0
EYE PAIN: 0

## 2025-06-04 ASSESSMENT — PAIN SCALES - GENERAL: PAINLEVEL_OUTOF10: 6

## 2025-06-04 ASSESSMENT — PAIN DESCRIPTION - LOCATION: LOCATION: HEAD

## 2025-06-04 ASSESSMENT — PAIN DESCRIPTION - PAIN TYPE: TYPE: ACUTE PAIN

## 2025-06-04 ASSESSMENT — PAIN DESCRIPTION - FREQUENCY: FREQUENCY: CONTINUOUS

## 2025-06-04 ASSESSMENT — PAIN - FUNCTIONAL ASSESSMENT
PAIN_FUNCTIONAL_ASSESSMENT: ACTIVITIES ARE NOT PREVENTED
PAIN_FUNCTIONAL_ASSESSMENT: 0-10

## 2025-06-04 ASSESSMENT — PAIN DESCRIPTION - DESCRIPTORS: DESCRIPTORS: ACHING

## 2025-06-04 ASSESSMENT — PAIN DESCRIPTION - ORIENTATION: ORIENTATION: POSTERIOR

## 2025-06-04 NOTE — TELEPHONE ENCOUNTER
Spoke to patient who reports she is having headaches, light headedness, episodes of dizziness and nausea since have LP done on 6/2/2025.  Reports headache is pounding. 9/10 pain level.  Patient was advised during LP if she has headaches, contact [facility] prior to going to ED.    Informed patient I would notify provider and return call.  Informed patient if headache gets worse, seek urgent care.  Patient stated understanding.

## 2025-06-04 NOTE — ED PROVIDER NOTES
[facility] EMERGENCY DEPARTMENT  EMERGENCY DEPARTMENT ENCOUNTER      Pt Name: Miin Dobbins  MRN: [id number]  Birthdate 1995  Date of evaluation: 6/4/2025  Provider: KOFFI HOLLINS    CHIEF COMPLAINT       Chief Complaint   Patient presents with    Headache         HISTORY OF PRESENT ILLNESS   (Location/Symptom, Timing/Onset, Context/Setting, Quality, Duration, Modifying Factors, Severity)  Note limiting factors.   29-year-old female presents ED with headache status post spinal tap.  Patient had a spinal tap done on 06/02 at [facility] with Dr. Jack for evaluation of some pulsatile tinnitus and elevated intracranial pressure she was experiencing, evaluating for ICH. She reports that she was told that her headache would last a few days. However, last night he headache worsened with associated nausea and lightheadedness. She notes that the headache radiated into her shoulders. She has been trying water, caffeine, and rest. She took 3 tylenols are 0900 and drank caffeine with little relief. Notes nausea and dizziness have resolved. She called Dr. Jack today, they recommended she come in and be evaluated for a blood patch. She notes that she is having numbness in her \"genital area\" and also feels like her L leg is a little more painful.            Review of External Medical Records:     Nursing Notes were reviewed.    REVIEW OF SYSTEMS    (2-9 systems for level 4, 10 or more for level 5)     Review of Systems   Constitutional:  Negative for chills and fever.   HENT:  Negative for congestion, ear pain and sore throat.    Eyes:  Negative for pain.   Respiratory:  Negative for cough and shortness of breath.    Cardiovascular:  Negative for chest pain.   Gastrointestinal:  Negative for abdominal pain, diarrhea, nausea and vomiting.   Genitourinary:  Negative for dysuria and flank pain.   Musculoskeletal:  Negative for myalgias.   Skin:  Negative for rash.   Neurological:  Positive for weakness, numbness and

## 2025-06-04 NOTE — ED TRIAGE NOTES
Patient arrives to ED reports worsening headache, patient had LP on Monday.  Reports last night headache went from shoulders to neck, + light sensitivity, dizziness, and nausea.     + groin numbness  
0 (no pain/absence of nonverbal indicators of pain)

## 2025-06-25 ENCOUNTER — OFFICE VISIT (OUTPATIENT)
Age: 30
End: 2025-06-25
Payer: COMMERCIAL

## 2025-06-25 VITALS
BODY MASS INDEX: 36.91 KG/M2 | HEART RATE: 62 BPM | DIASTOLIC BLOOD PRESSURE: 78 MMHG | HEIGHT: 62 IN | TEMPERATURE: 98.1 F | WEIGHT: 200.6 LBS | RESPIRATION RATE: 18 BRPM | SYSTOLIC BLOOD PRESSURE: 120 MMHG

## 2025-06-25 DIAGNOSIS — H93.A3 PULSATILE TINNITUS OF BOTH EARS: Primary | ICD-10-CM

## 2025-06-25 PROCEDURE — 99213 OFFICE O/P EST LOW 20 MIN: CPT | Performed by: RADIOLOGY

## 2025-06-25 NOTE — PROGRESS NOTES
Follow up for Bilateral Pulsatile tinnitus and Lumbar puncture results.  Patient reports continued feeling of pressure in her head.  Denies any other symptoms.  No new problems reported.  Message sent to Dr Maldonado via EPIC with LP results to see if patient can be seen sooner.

## 2025-07-02 NOTE — PROGRESS NOTES
Neurointerventional Surgery Clinic Note        Patient: Mini Dobbins MRN: [id number]  SSN: [id number]    YOB: 1995  Age: 29 y.o.  Sex: female      Subjective:      Mini Dobbins is a 29 y.o. female with history of pulsatile tinnitus, who is being seen for follow-up after undergoing fluoroscopy guided lumbar puncture to assess CSF pressures.    Past Medical History:   Diagnosis Date    Abnormal Papanicolaou smear of cervix 2016    2018 ASCUS    ADHD (attention deficit hyperactivity disorder)     Anxiety     Asthma     CHILDHOOD    Bipolar disorder (HCC)     Depression     bipolar depression-stopped abilify 2 nd trimester    Depression     Genital herpes 2016    on  valtrex    Gonorrhea 2017    treated    Head injury     Headache     [facility]     Hyperlipidemia     Ill-defined condition     pain and nausea associated with gallbladder    Liver disease     \"benign blood clot in liver\"- 2018- on baby ASA    Thromboembolus (HCC) 2018    BENIGN BLOOD CLOT NEAR LIVER      Past Surgical History:   Procedure Laterality Date    CHOLECYSTECTOMY  2017    GI      CHOLECYSTECTOMY    OTHER SURGICAL HISTORY  11/2017    gallbladder removal      Family History   Problem Relation Age of Onset    Depression Mother     Diabetes Mother     High Blood Pressure Mother     High Cholesterol Mother     High Blood Pressure Father     Cancer Maternal Grandfather         COLON AND LUNG CA    Diabetes Maternal Grandfather     Gout Maternal Grandfather     High Blood Pressure Maternal Grandfather     High Cholesterol Maternal Grandfather     Prostate Cancer Maternal Grandfather     Arthritis Maternal Grandmother     Breast Cancer Maternal Grandmother     Diabetes Maternal Grandmother     High Blood Pressure Maternal Grandmother     High Cholesterol Maternal Grandmother     Diabetes Maternal Aunt     High Blood Pressure Maternal Aunt     Hypertension Maternal Grandfather

## 2025-08-15 ENCOUNTER — TELEPHONE (OUTPATIENT)
Age: 30
End: 2025-08-15

## (undated) DEVICE — Z DISCONTINUED USE 2659133 CANNULA VAC DIA8MM CRV SEMI RIG W/ ROUNDED TIP TAPR END

## (undated) DEVICE — CLICKLINE SCISSORS INSERT: Brand: CLICKLINE

## (undated) DEVICE — REM POLYHESIVE ADULT PATIENT RETURN ELECTRODE: Brand: VALLEYLAB

## (undated) DEVICE — COLLECTION KT SUC TISS BERK -- GYRUS

## (undated) DEVICE — TROCAR SITE CLOSURE DEVICE: Brand: ENDO CLOSE

## (undated) DEVICE — SUTURE SZ 0 27IN 5/8 CIR UR-6  TAPER PT VIOLET ABSRB VICRYL J603H

## (undated) DEVICE — BLADELESS OPTICAL TROCAR WITH FIXATION CANNULA: Brand: VERSAPORT

## (undated) DEVICE — SPECIMEN RETRIEVAL POUCH: Brand: ENDO CATCH GOLD

## (undated) DEVICE — PACK,LITHOTOMY,PK I: Brand: MEDLINE

## (undated) DEVICE — TUBING INSUFLTN 10FT LUER -- CONVERT TO ITEM 368568

## (undated) DEVICE — DEVON™ KNEE AND BODY STRAP 60" X 3" (1.5 M X 7.6 CM): Brand: DEVON

## (undated) DEVICE — HANDLE LT SNAP ON ULT DURABLE LENS FOR TRUMPF ALC DISPOSABLE

## (undated) DEVICE — PAD SANIT NPKN 4IN GRD

## (undated) DEVICE — LIGHT HANDLE: Brand: DEVON

## (undated) DEVICE — ELECTRODE ES 36CM LAP FLAT L HK COAT DISP CLEANCOAT

## (undated) DEVICE — CATH URETH INTMIT ROB 16FR FUN -- CONVERT TO ITEM 179520

## (undated) DEVICE — 3000CC GUARDIAN II: Brand: GUARDIAN

## (undated) DEVICE — SKIN MARKER,REGULAR TIP WITH RULER AND LABELS: Brand: DEVON

## (undated) DEVICE — NEEDLE SPNL 22GA L3.5IN BLK HUB S STL REG WALL FIT STYL W/

## (undated) DEVICE — Z INACTIVE USE 2527070 DRAPE SURG W40XL44IN UNDERBUTTOCK SMS POLYPR W/ PCH BK DISP

## (undated) DEVICE — HANDLE SUCT TBNG L6FR DIA3/8IN SWVL W/ M ADPT FOR BERK PMP

## (undated) DEVICE — KENDALL SCD EXPRESS SLEEVES, KNEE LENGTH, MEDIUM: Brand: KENDALL SCD

## (undated) DEVICE — STERILE POLYISOPRENE POWDER-FREE SURGICAL GLOVES WITH EMOLLIENT COATING: Brand: PROTEXIS

## (undated) DEVICE — TOWEL SURG W17XL27IN STD BLU COT NONFENESTRATED PREWASHED

## (undated) DEVICE — SYR 10ML LUER LOK 1/5ML GRAD --

## (undated) DEVICE — BLADELESS OPTICAL TROCAR WITH FIXATION CANNULA: Brand: VERSAONE

## (undated) DEVICE — NEEDLE HYPO 22GA L1.5IN BLK S STL HUB POLYPR SHLD REG BVL

## (undated) DEVICE — SURGICAL PROCEDURE KIT GEN LAPAROSCOPY LF

## (undated) DEVICE — TRAY PREP DRY W/ PREM GLV 2 APPL 6 SPNG 2 UNDPD 1 OVERWRAP

## (undated) DEVICE — DERMABOND SKIN ADH 0.7ML -- DERMABOND ADVANCED 12/BX

## (undated) DEVICE — INFECTION CONTROL KIT SYS

## (undated) DEVICE — X-RAY SPONGES,16 PLY: Brand: DERMACEA

## (undated) DEVICE — SUTURE MCRYL SZ 4-0 L27IN ABSRB UD L19MM PS-2 1/2 CIR PRIM Y426H

## (undated) DEVICE — (D)PREP SKN CHLRAPRP APPL 26ML -- CONVERT TO ITEM 371833

## (undated) DEVICE — FILTER SMK EVAC FLO CLR MEGADYNE

## (undated) DEVICE — UNIVERSAL FIXATION CANNULA: Brand: VERSAONE

## (undated) DEVICE — APPLIER LIG CLP 5MM CONTAIN 16 TI CLP DISP ENDO CLP